# Patient Record
Sex: MALE | Race: OTHER | HISPANIC OR LATINO | URBAN - METROPOLITAN AREA
[De-identification: names, ages, dates, MRNs, and addresses within clinical notes are randomized per-mention and may not be internally consistent; named-entity substitution may affect disease eponyms.]

---

## 2017-05-24 ENCOUNTER — EMERGENCY (EMERGENCY)
Facility: HOSPITAL | Age: 47
LOS: 1 days | Discharge: HOME | End: 2017-05-24
Attending: EMERGENCY MEDICINE
Payer: COMMERCIAL

## 2017-05-24 VITALS
HEIGHT: 69 IN | RESPIRATION RATE: 20 BRPM | HEART RATE: 89 BPM | SYSTOLIC BLOOD PRESSURE: 142 MMHG | OXYGEN SATURATION: 97 % | DIASTOLIC BLOOD PRESSURE: 83 MMHG | TEMPERATURE: 97 F | WEIGHT: 220.02 LBS

## 2017-05-24 VITALS
RESPIRATION RATE: 18 BRPM | SYSTOLIC BLOOD PRESSURE: 144 MMHG | DIASTOLIC BLOOD PRESSURE: 86 MMHG | HEART RATE: 93 BPM | TEMPERATURE: 99 F | OXYGEN SATURATION: 100 %

## 2017-05-24 LAB
ALBUMIN SERPL ELPH-MCNC: 4 G/DL — SIGNIFICANT CHANGE UP (ref 3.5–5)
ALP SERPL-CCNC: 60 U/L — SIGNIFICANT CHANGE UP (ref 40–120)
ALT FLD-CCNC: 38 U/L DA — SIGNIFICANT CHANGE UP (ref 10–60)
ANION GAP SERPL CALC-SCNC: 8 MMOL/L — SIGNIFICANT CHANGE UP (ref 5–17)
APPEARANCE UR: CLEAR — SIGNIFICANT CHANGE UP
AST SERPL-CCNC: 29 U/L — SIGNIFICANT CHANGE UP (ref 10–40)
BASOPHILS # BLD AUTO: 0.1 K/UL — SIGNIFICANT CHANGE UP (ref 0–0.2)
BASOPHILS NFR BLD AUTO: 0.7 % — SIGNIFICANT CHANGE UP (ref 0–2)
BILIRUB SERPL-MCNC: 0.4 MG/DL — SIGNIFICANT CHANGE UP (ref 0.2–1.2)
BILIRUB UR-MCNC: NEGATIVE — SIGNIFICANT CHANGE UP
BUN SERPL-MCNC: 17 MG/DL — SIGNIFICANT CHANGE UP (ref 7–18)
CALCIUM SERPL-MCNC: 8.7 MG/DL — SIGNIFICANT CHANGE UP (ref 8.4–10.5)
CHLORIDE SERPL-SCNC: 107 MMOL/L — SIGNIFICANT CHANGE UP (ref 96–108)
CO2 SERPL-SCNC: 26 MMOL/L — SIGNIFICANT CHANGE UP (ref 22–31)
COLOR SPEC: YELLOW — SIGNIFICANT CHANGE UP
CREAT SERPL-MCNC: 1.43 MG/DL — HIGH (ref 0.5–1.3)
DIFF PNL FLD: ABNORMAL
EOSINOPHIL # BLD AUTO: 0 K/UL — SIGNIFICANT CHANGE UP (ref 0–0.5)
EOSINOPHIL NFR BLD AUTO: 0.1 % — SIGNIFICANT CHANGE UP (ref 0–6)
GLUCOSE SERPL-MCNC: 122 MG/DL — HIGH (ref 70–99)
GLUCOSE UR QL: NEGATIVE — SIGNIFICANT CHANGE UP
HCT VFR BLD CALC: 51.6 % — HIGH (ref 39–50)
HGB BLD-MCNC: 16.6 G/DL — SIGNIFICANT CHANGE UP (ref 13–17)
KETONES UR-MCNC: NEGATIVE — SIGNIFICANT CHANGE UP
LEUKOCYTE ESTERASE UR-ACNC: NEGATIVE — SIGNIFICANT CHANGE UP
LIDOCAIN IGE QN: 110 U/L — SIGNIFICANT CHANGE UP (ref 73–393)
LYMPHOCYTES # BLD AUTO: 0.9 K/UL — LOW (ref 1–3.3)
LYMPHOCYTES # BLD AUTO: 6 % — LOW (ref 13–44)
MCHC RBC-ENTMCNC: 28.4 PG — SIGNIFICANT CHANGE UP (ref 27–34)
MCHC RBC-ENTMCNC: 32.2 GM/DL — SIGNIFICANT CHANGE UP (ref 32–36)
MCV RBC AUTO: 88 FL — SIGNIFICANT CHANGE UP (ref 80–100)
MONOCYTES # BLD AUTO: 0.6 K/UL — SIGNIFICANT CHANGE UP (ref 0–0.9)
MONOCYTES NFR BLD AUTO: 4.4 % — SIGNIFICANT CHANGE UP (ref 2–14)
NEUTROPHILS # BLD AUTO: 13.2 K/UL — HIGH (ref 1.8–7.4)
NEUTROPHILS NFR BLD AUTO: 88.9 % — HIGH (ref 43–77)
NITRITE UR-MCNC: NEGATIVE — SIGNIFICANT CHANGE UP
PH UR: 5 — SIGNIFICANT CHANGE UP (ref 5–8)
PLATELET # BLD AUTO: 260 K/UL — SIGNIFICANT CHANGE UP (ref 150–400)
POTASSIUM SERPL-MCNC: 4.4 MMOL/L — SIGNIFICANT CHANGE UP (ref 3.5–5.3)
POTASSIUM SERPL-SCNC: 4.4 MMOL/L — SIGNIFICANT CHANGE UP (ref 3.5–5.3)
PROT SERPL-MCNC: 7.7 G/DL — SIGNIFICANT CHANGE UP (ref 6–8.3)
PROT UR-MCNC: 100
RBC # BLD: 5.86 M/UL — HIGH (ref 4.2–5.8)
RBC # FLD: 12.6 % — SIGNIFICANT CHANGE UP (ref 10.3–14.5)
SODIUM SERPL-SCNC: 141 MMOL/L — SIGNIFICANT CHANGE UP (ref 135–145)
SP GR SPEC: 1.02 — SIGNIFICANT CHANGE UP (ref 1.01–1.02)
UROBILINOGEN FLD QL: NEGATIVE — SIGNIFICANT CHANGE UP
WBC # BLD: 14.8 K/UL — HIGH (ref 3.8–10.5)
WBC # FLD AUTO: 14.8 K/UL — HIGH (ref 3.8–10.5)

## 2017-05-24 PROCEDURE — 99284 EMERGENCY DEPT VISIT MOD MDM: CPT

## 2017-05-24 PROCEDURE — 74176 CT ABD & PELVIS W/O CONTRAST: CPT | Mod: 26

## 2017-05-24 PROCEDURE — 74176 CT ABD & PELVIS W/O CONTRAST: CPT

## 2017-05-24 PROCEDURE — 83690 ASSAY OF LIPASE: CPT

## 2017-05-24 PROCEDURE — 99284 EMERGENCY DEPT VISIT MOD MDM: CPT | Mod: 25

## 2017-05-24 PROCEDURE — 81001 URINALYSIS AUTO W/SCOPE: CPT

## 2017-05-24 PROCEDURE — 85027 COMPLETE CBC AUTOMATED: CPT

## 2017-05-24 PROCEDURE — 80053 COMPREHEN METABOLIC PANEL: CPT

## 2017-05-24 RX ORDER — KETOROLAC TROMETHAMINE 30 MG/ML
30 SYRINGE (ML) INJECTION ONCE
Qty: 0 | Refills: 0 | Status: DISCONTINUED | OUTPATIENT
Start: 2017-05-24 | End: 2017-05-24

## 2017-05-24 RX ORDER — OXYCODONE HYDROCHLORIDE 5 MG/1
1 TABLET ORAL
Qty: 12 | Refills: 0 | OUTPATIENT
Start: 2017-05-24 | End: 2017-05-27

## 2017-05-24 RX ORDER — IBUPROFEN 200 MG
1 TABLET ORAL
Qty: 20 | Refills: 0 | OUTPATIENT
Start: 2017-05-24 | End: 2017-05-29

## 2017-05-24 RX ORDER — TAMSULOSIN HYDROCHLORIDE 0.4 MG/1
1 CAPSULE ORAL
Qty: 14 | Refills: 0 | OUTPATIENT
Start: 2017-05-24 | End: 2017-06-07

## 2017-05-24 RX ORDER — ONDANSETRON 8 MG/1
4 TABLET, FILM COATED ORAL ONCE
Qty: 0 | Refills: 0 | Status: COMPLETED | OUTPATIENT
Start: 2017-05-24 | End: 2017-05-24

## 2017-05-24 RX ADMIN — ONDANSETRON 4 MILLIGRAM(S): 8 TABLET, FILM COATED ORAL at 14:56

## 2017-05-24 RX ADMIN — Medication 30 MILLIGRAM(S): at 14:56

## 2017-05-24 NOTE — ED PROVIDER NOTE - CONSTITUTIONAL, MLM
- - - Well appearing, well nourished, awake, alert, oriented to person, place, time/situation. normal...

## 2017-05-24 NOTE — ED PROVIDER NOTE - OBJECTIVE STATEMENT
45 y/o male with PMHx of Gout and DM presents to the ED c/o abdominal pain, back pain and vomiting today. This morning pt noticed blood in his urine so he went to his PMD who prescribed Cipro. Pt took the Cipro and then began vomiting 7x and feeling abdominal and back pain diffusely, but more on L side. Pt denies fever, chills, CP, SOB, diarrhea, or any other complaints. NKDA.

## 2017-05-24 NOTE — ED PROVIDER NOTE - MEDICAL DECISION MAKING DETAILS
45 y/o male with L flank pain, hematuria and vomiting. On exam had diffusely tender abdomen. Likely kidney stone. Will check labs to rule out other abdominal pathology and CT to confirm stone. Will treat with Toradol and Zofran.

## 2017-05-24 NOTE — ED PROVIDER NOTE - NS ED MD SCRIBE ATTENDING SCRIBE SECTIONS
PHYSICAL EXAM/DISPOSITION/VITAL SIGNS( Pullset)/HIV/HISTORY OF PRESENT ILLNESS/PAST MEDICAL/SURGICAL/SOCIAL HISTORY/REVIEW OF SYSTEMS

## 2017-05-24 NOTE — ED ADULT NURSE NOTE - OBJECTIVE STATEMENT
Patient complains of abdominal pain radiating to bilateral flank pain x today. Patient states dysuria, hematuria x today. Abdomen is soft, non distended, tender with facial grimacing upon palpation in all 4 quadrants. Denies fever, chills. MD evaluation in progress.

## 2017-05-26 ENCOUNTER — INPATIENT (INPATIENT)
Facility: HOSPITAL | Age: 47
LOS: 2 days | Discharge: HOME | DRG: 872 | End: 2017-05-29
Attending: INTERNAL MEDICINE | Admitting: INTERNAL MEDICINE
Payer: COMMERCIAL

## 2017-05-26 VITALS
HEIGHT: 69 IN | OXYGEN SATURATION: 99 % | HEART RATE: 140 BPM | RESPIRATION RATE: 20 BRPM | WEIGHT: 220.02 LBS | SYSTOLIC BLOOD PRESSURE: 153 MMHG | DIASTOLIC BLOOD PRESSURE: 83 MMHG | TEMPERATURE: 101 F

## 2017-05-26 LAB
ALBUMIN SERPL ELPH-MCNC: 3.2 G/DL — LOW (ref 3.5–5)
ANION GAP SERPL CALC-SCNC: 8 MMOL/L — SIGNIFICANT CHANGE UP (ref 5–17)
BUN SERPL-MCNC: 21 MG/DL — HIGH (ref 7–18)
CALCIUM SERPL-MCNC: 8.1 MG/DL — LOW (ref 8.4–10.5)
CHLORIDE SERPL-SCNC: 102 MMOL/L — SIGNIFICANT CHANGE UP (ref 96–108)
CO2 SERPL-SCNC: 27 MMOL/L — SIGNIFICANT CHANGE UP (ref 22–31)
GLUCOSE SERPL-MCNC: 157 MG/DL — HIGH (ref 70–99)
HCT VFR BLD CALC: 45.5 % — SIGNIFICANT CHANGE UP (ref 39–50)
HGB BLD-MCNC: 15.1 G/DL — SIGNIFICANT CHANGE UP (ref 13–17)
MCHC RBC-ENTMCNC: 29.1 PG — SIGNIFICANT CHANGE UP (ref 27–34)
MCHC RBC-ENTMCNC: 33.1 GM/DL — SIGNIFICANT CHANGE UP (ref 32–36)
MCV RBC AUTO: 88 FL — SIGNIFICANT CHANGE UP (ref 80–100)
PLATELET # BLD AUTO: 189 K/UL — SIGNIFICANT CHANGE UP (ref 150–400)
POTASSIUM SERPL-MCNC: 3.7 MMOL/L — SIGNIFICANT CHANGE UP (ref 3.5–5.3)
POTASSIUM SERPL-SCNC: 3.7 MMOL/L — SIGNIFICANT CHANGE UP (ref 3.5–5.3)
RBC # BLD: 5.17 M/UL — SIGNIFICANT CHANGE UP (ref 4.2–5.8)
RBC # FLD: 13.4 % — SIGNIFICANT CHANGE UP (ref 10.3–14.5)
SODIUM SERPL-SCNC: 137 MMOL/L — SIGNIFICANT CHANGE UP (ref 135–145)
WBC # BLD: 15.5 K/UL — HIGH (ref 3.8–10.5)
WBC # FLD AUTO: 15.5 K/UL — HIGH (ref 3.8–10.5)

## 2017-05-26 RX ORDER — SODIUM CHLORIDE 9 MG/ML
2000 INJECTION INTRAMUSCULAR; INTRAVENOUS; SUBCUTANEOUS ONCE
Qty: 0 | Refills: 0 | Status: COMPLETED | OUTPATIENT
Start: 2017-05-26 | End: 2017-05-26

## 2017-05-26 RX ORDER — KETOROLAC TROMETHAMINE 30 MG/ML
30 SYRINGE (ML) INJECTION ONCE
Qty: 0 | Refills: 0 | Status: DISCONTINUED | OUTPATIENT
Start: 2017-05-26 | End: 2017-05-26

## 2017-05-26 RX ORDER — TAMSULOSIN HYDROCHLORIDE 0.4 MG/1
0.4 CAPSULE ORAL AT BEDTIME
Qty: 0 | Refills: 0 | Status: DISCONTINUED | OUTPATIENT
Start: 2017-05-26 | End: 2017-05-29

## 2017-05-26 RX ORDER — ACETAMINOPHEN 500 MG
975 TABLET ORAL ONCE
Qty: 0 | Refills: 0 | Status: COMPLETED | OUTPATIENT
Start: 2017-05-26 | End: 2017-05-27

## 2017-05-26 RX ORDER — CEFTRIAXONE 500 MG/1
1 INJECTION, POWDER, FOR SOLUTION INTRAMUSCULAR; INTRAVENOUS ONCE
Qty: 0 | Refills: 0 | Status: COMPLETED | OUTPATIENT
Start: 2017-05-26 | End: 2017-05-26

## 2017-05-26 NOTE — ED PROVIDER NOTE - NS ED MD SCRIBE ATTENDING SCRIBE SECTIONS
REVIEW OF SYSTEMS/VITAL SIGNS( Pullset)/HIV/HISTORY OF PRESENT ILLNESS/PAST MEDICAL/SURGICAL/SOCIAL HISTORY/DISPOSITION/PHYSICAL EXAM

## 2017-05-26 NOTE — ED PROVIDER NOTE - OBJECTIVE STATEMENT
47 y/o M pt with PMHx of DM, presents to ED c/o back pain, with associating fever, chills, HA, and LLQ abd pain X today. Pt reports he had hematuria 6 days ago, visited his and was prescribed Cipro. Pt visited the ED 2 days ago for persistent pain, was diagnosed with a stone and was dc'd home with pain medication. Pt visits the ED today for concerns of a possible infection secondary to new onset of fever. Pt denies SOB, cough, CP, palpitations, dizziness, nausea, vomiting, diarrhea, or any other complaints. NKDA.

## 2017-05-27 DIAGNOSIS — N20.0 CALCULUS OF KIDNEY: ICD-10-CM

## 2017-05-27 DIAGNOSIS — N12 TUBULO-INTERSTITIAL NEPHRITIS, NOT SPECIFIED AS ACUTE OR CHRONIC: ICD-10-CM

## 2017-05-27 DIAGNOSIS — J98.11 ATELECTASIS: ICD-10-CM

## 2017-05-27 DIAGNOSIS — E11.9 TYPE 2 DIABETES MELLITUS WITHOUT COMPLICATIONS: ICD-10-CM

## 2017-05-27 DIAGNOSIS — Z29.9 ENCOUNTER FOR PROPHYLACTIC MEASURES, UNSPECIFIED: ICD-10-CM

## 2017-05-27 DIAGNOSIS — N28.9 DISORDER OF KIDNEY AND URETER, UNSPECIFIED: ICD-10-CM

## 2017-05-27 DIAGNOSIS — M10.9 GOUT, UNSPECIFIED: ICD-10-CM

## 2017-05-27 DIAGNOSIS — Z91.013 ALLERGY TO SEAFOOD: ICD-10-CM

## 2017-05-27 LAB
ALP SERPL-CCNC: 74 U/L — SIGNIFICANT CHANGE UP (ref 40–120)
ALT FLD-CCNC: 52 U/L DA — SIGNIFICANT CHANGE UP (ref 10–60)
ANION GAP SERPL CALC-SCNC: 9 MMOL/L — SIGNIFICANT CHANGE UP (ref 5–17)
APPEARANCE UR: CLEAR — SIGNIFICANT CHANGE UP
AST SERPL-CCNC: 41 U/L — HIGH (ref 10–40)
BACTERIA # UR AUTO: ABNORMAL /HPF
BILIRUB SERPL-MCNC: 1 MG/DL — SIGNIFICANT CHANGE UP (ref 0.2–1.2)
BILIRUB UR-MCNC: NEGATIVE — SIGNIFICANT CHANGE UP
BUN SERPL-MCNC: 23 MG/DL — HIGH (ref 7–18)
CALCIUM SERPL-MCNC: 7.8 MG/DL — LOW (ref 8.4–10.5)
CHLORIDE SERPL-SCNC: 106 MMOL/L — SIGNIFICANT CHANGE UP (ref 96–108)
CO2 SERPL-SCNC: 25 MMOL/L — SIGNIFICANT CHANGE UP (ref 22–31)
COLOR SPEC: YELLOW — SIGNIFICANT CHANGE UP
COMMENT - URINE: SIGNIFICANT CHANGE UP
CREAT SERPL-MCNC: 1.85 MG/DL — HIGH (ref 0.5–1.3)
CREAT SERPL-MCNC: 1.88 MG/DL — HIGH (ref 0.5–1.3)
DIFF PNL FLD: ABNORMAL
EPI CELLS # UR: ABNORMAL (ref 0–10)
GLUCOSE SERPL-MCNC: 102 MG/DL — HIGH (ref 70–99)
GLUCOSE UR QL: NEGATIVE — SIGNIFICANT CHANGE UP
GRAN CASTS # UR COMP ASSIST: ABNORMAL
HYALINE CASTS # UR AUTO: ABNORMAL
KETONES UR-MCNC: NEGATIVE — SIGNIFICANT CHANGE UP
LACTATE SERPL-SCNC: 0.8 MMOL/L — SIGNIFICANT CHANGE UP (ref 0.7–2)
LACTATE SERPL-SCNC: 1.2 MMOL/L — SIGNIFICANT CHANGE UP (ref 0.7–2)
LEUKOCYTE ESTERASE UR-ACNC: ABNORMAL
LYMPHOCYTES # BLD AUTO: 3 % — LOW (ref 13–44)
MONOCYTES NFR BLD AUTO: 14 % — SIGNIFICANT CHANGE UP (ref 2–14)
NEUTROPHILS NFR BLD AUTO: 82 % — HIGH (ref 43–77)
NITRITE UR-MCNC: NEGATIVE — SIGNIFICANT CHANGE UP
PH UR: 5 — SIGNIFICANT CHANGE UP (ref 5–8)
PLAT MORPH BLD: NORMAL — SIGNIFICANT CHANGE UP
POTASSIUM SERPL-MCNC: 4.2 MMOL/L — SIGNIFICANT CHANGE UP (ref 3.5–5.3)
POTASSIUM SERPL-SCNC: 4.2 MMOL/L — SIGNIFICANT CHANGE UP (ref 3.5–5.3)
PROT SERPL-MCNC: 7.4 G/DL — SIGNIFICANT CHANGE UP (ref 6–8.3)
PROT UR-MCNC: 30 MG/DL
RBC BLD AUTO: NORMAL — SIGNIFICANT CHANGE UP
RBC CASTS # UR COMP ASSIST: ABNORMAL /HPF (ref 0–2)
SODIUM SERPL-SCNC: 140 MMOL/L — SIGNIFICANT CHANGE UP (ref 135–145)
SP GR SPEC: 1.02 — SIGNIFICANT CHANGE UP (ref 1.01–1.02)
URATE SERPL-MCNC: 7.6 MG/DL — SIGNIFICANT CHANGE UP (ref 3.4–8.8)
UROBILINOGEN FLD QL: NEGATIVE — SIGNIFICANT CHANGE UP
VARIANT LYMPHS # BLD: 1 % — SIGNIFICANT CHANGE UP (ref 0–6)
WBC UR QL: SIGNIFICANT CHANGE UP /HPF (ref 0–5)

## 2017-05-27 PROCEDURE — 74176 CT ABD & PELVIS W/O CONTRAST: CPT | Mod: 26

## 2017-05-27 PROCEDURE — 71010: CPT | Mod: 26

## 2017-05-27 PROCEDURE — 99285 EMERGENCY DEPT VISIT HI MDM: CPT

## 2017-05-27 RX ORDER — ONDANSETRON 8 MG/1
4 TABLET, FILM COATED ORAL ONCE
Qty: 0 | Refills: 0 | Status: COMPLETED | OUTPATIENT
Start: 2017-05-27 | End: 2017-05-27

## 2017-05-27 RX ORDER — ONDANSETRON 8 MG/1
8 TABLET, FILM COATED ORAL ONCE
Qty: 0 | Refills: 0 | Status: COMPLETED | OUTPATIENT
Start: 2017-05-27 | End: 2017-05-27

## 2017-05-27 RX ORDER — CEFTRIAXONE 500 MG/1
1 INJECTION, POWDER, FOR SOLUTION INTRAMUSCULAR; INTRAVENOUS EVERY 24 HOURS
Qty: 0 | Refills: 0 | Status: DISCONTINUED | OUTPATIENT
Start: 2017-05-27 | End: 2017-05-29

## 2017-05-27 RX ORDER — KETOROLAC TROMETHAMINE 30 MG/ML
15 SYRINGE (ML) INJECTION EVERY 6 HOURS
Qty: 0 | Refills: 0 | Status: DISCONTINUED | OUTPATIENT
Start: 2017-05-27 | End: 2017-05-27

## 2017-05-27 RX ORDER — SODIUM CHLORIDE 9 MG/ML
1000 INJECTION INTRAMUSCULAR; INTRAVENOUS; SUBCUTANEOUS
Qty: 0 | Refills: 0 | Status: DISCONTINUED | OUTPATIENT
Start: 2017-05-27 | End: 2017-05-29

## 2017-05-27 RX ORDER — ACETAMINOPHEN 500 MG
650 TABLET ORAL ONCE
Qty: 0 | Refills: 0 | Status: COMPLETED | OUTPATIENT
Start: 2017-05-27 | End: 2017-05-27

## 2017-05-27 RX ORDER — MORPHINE SULFATE 50 MG/1
4 CAPSULE, EXTENDED RELEASE ORAL ONCE
Qty: 0 | Refills: 0 | Status: DISCONTINUED | OUTPATIENT
Start: 2017-05-27 | End: 2017-05-27

## 2017-05-27 RX ADMIN — ONDANSETRON 4 MILLIGRAM(S): 8 TABLET, FILM COATED ORAL at 17:47

## 2017-05-27 RX ADMIN — SODIUM CHLORIDE 2000 MILLILITER(S): 9 INJECTION INTRAMUSCULAR; INTRAVENOUS; SUBCUTANEOUS at 00:31

## 2017-05-27 RX ADMIN — SODIUM CHLORIDE 100 MILLILITER(S): 9 INJECTION INTRAMUSCULAR; INTRAVENOUS; SUBCUTANEOUS at 06:38

## 2017-05-27 RX ADMIN — Medication 30 MILLIGRAM(S): at 00:34

## 2017-05-27 RX ADMIN — SODIUM CHLORIDE 100 MILLILITER(S): 9 INJECTION INTRAMUSCULAR; INTRAVENOUS; SUBCUTANEOUS at 04:41

## 2017-05-27 RX ADMIN — MORPHINE SULFATE 4 MILLIGRAM(S): 50 CAPSULE, EXTENDED RELEASE ORAL at 03:00

## 2017-05-27 RX ADMIN — TAMSULOSIN HYDROCHLORIDE 0.4 MILLIGRAM(S): 0.4 CAPSULE ORAL at 00:34

## 2017-05-27 RX ADMIN — CEFTRIAXONE 100 GRAM(S): 500 INJECTION, POWDER, FOR SOLUTION INTRAMUSCULAR; INTRAVENOUS at 05:05

## 2017-05-27 RX ADMIN — Medication 30 MILLIGRAM(S): at 02:39

## 2017-05-27 RX ADMIN — ONDANSETRON 4 MILLIGRAM(S): 8 TABLET, FILM COATED ORAL at 12:30

## 2017-05-27 RX ADMIN — CEFTRIAXONE 100 GRAM(S): 500 INJECTION, POWDER, FOR SOLUTION INTRAMUSCULAR; INTRAVENOUS at 00:34

## 2017-05-27 RX ADMIN — MORPHINE SULFATE 4 MILLIGRAM(S): 50 CAPSULE, EXTENDED RELEASE ORAL at 02:31

## 2017-05-27 RX ADMIN — Medication 975 MILLIGRAM(S): at 00:34

## 2017-05-27 RX ADMIN — SODIUM CHLORIDE 100 MILLILITER(S): 9 INJECTION INTRAMUSCULAR; INTRAVENOUS; SUBCUTANEOUS at 18:59

## 2017-05-27 RX ADMIN — Medication 650 MILLIGRAM(S): at 23:51

## 2017-05-27 RX ADMIN — Medication 650 MILLIGRAM(S): at 22:51

## 2017-05-27 RX ADMIN — TAMSULOSIN HYDROCHLORIDE 0.4 MILLIGRAM(S): 0.4 CAPSULE ORAL at 22:51

## 2017-05-27 NOTE — H&P ADULT - PROBLEM SELECTOR PLAN 2
with urate crystals and noted hx of gout  pt is no longer on allopurinol, but given acute uric acid stones- pt will likely need to initiate chronic therapy.  f/u uric acid levels

## 2017-05-27 NOTE — H&P ADULT - ASSESSMENT
46 M with gout p/w c/o L flank pain, hematuria, fever/chills with CT evidence of pyelitis, admitted with clinical pyelonephritis

## 2017-05-27 NOTE — H&P ADULT - ATTENDING COMMENTS
46 M from home with PMH of gout p/w c/o L flank pain, hematuria, fever/chills, and n/v for a few days. Pt tells that he developed hematuria and abdominal discomfort a few days, and then started having fevers, rigors, n/v, a/w L flank pain. Pt denies dysuria, cough, SOB, diarrhea, CP. In ED, pt is febrile to 100.5, leukocytosis >15k, CT concerning for L pyelitis (previously pt had 3.5 mm calculus in the left UVJ with mild left hydroureteronephrosis on CT from 3 days earlier). Pt given IVF and rocephin in ED. Pt passed stone in ED.    pt seen in bed, vitals stable except for tachicardia and tmx 100.5 on admission, physical exam reveals lungs cta b/l, heart s1s2, abd soft nd bs+, left flank tender, ext no edema. labs and diagnostic test result reviewed.    assessment  --  acute pyelonephritis, sepsis, agusto, s/p left renal stone with hydro, microscopic hematuria, atelectasis, h/o gout    plan  --  admit to med, rocephin, cont preadmit home meds, gi and dvt profilaxis,  cbc, bmp, mg, phos, lipids, tsh, bld cx, ua, ucx,    id cons  pulm cons

## 2017-05-27 NOTE — CONSULT NOTE ADULT - PROBLEM SELECTOR RECOMMENDATION 4
Monitor labs  Renal eval  Encourage fluid intake Monitor labs  Renal eval  Encourage fluid intake  Urology Eval

## 2017-05-27 NOTE — H&P ADULT - PROBLEM SELECTOR PLAN 1
with radiographic evidence of pyelitis on CT and clinical presentation consistent with pyelonephritis 2/2  stone.  c/w IV, rocephin; f/u cultures  pt passed stone

## 2017-05-27 NOTE — H&P ADULT - MUSCULOSKELETAL
negative detailed exam no joint erythema/ROM intact/no joint swelling/no joint warmth/no calf tenderness

## 2017-05-27 NOTE — H&P ADULT - HISTORY OF PRESENT ILLNESS
46 M from home with PMH of gout p/w c/o L flank pain, hematuria, fever/chills, and n/v for a few days. Pt tells that he developed hematuria and abdominal discomfort a few days, and then started having fevers, rigors, n/v, a/w L flank pain. Pt denies dysuria, cough, SOB, diarrhea, CP. In ED, pt is febrile to 100.5, leukocytosis >15k, CT concerning for L pyelitis (previously pt had 3.5 mm calculus in the left UVJ with mild left hydroureteronephrosis on CT from 3 days earlier). Pt given IVF and rocephin in ED. Pt passed stone in ED.

## 2017-05-27 NOTE — CONSULT NOTE ADULT - CONSULT REASON
Bibasilar Subsegmental Atelectasis on CT scan Renal Bibasilar Subsegmental Atelectasis on CT scan of Abdomen

## 2017-05-27 NOTE — CONSULT NOTE ADULT - SUBJECTIVE AND OBJECTIVE BOX
Patient is a 46y old  Male who presents with a chief complaint of flank pain, hematuria, fever (27 May 2017 04:57). He has hx of Renal stone.  Patient is being evaluated for bibasilar subsegmental atelectasis on CT scan renal. He has no cough, sob, wheezing  Awake, alert, comfortable in NAD.      INTERVAL HPI/OVERNIGHT EVENTS:      VITAL SIGNS:  T(F): 97.4  HR: 84  BP: 121/67  RR: 14  SpO2: 98%  Wt(kg): --  I&O's Detail          REVIEW OF SYSTEMS:    CONSTITUTIONAL:  No fevers, chills, sweats    HEENT:  Eyes:  No diplopia or blurred vision. ENT:  No earache, sore throat or runny nose.    CARDIOVASCULAR:  No pressure, squeezing, tightness, or heaviness about the chest; no palpitations.    RESPIRATORY:  Per HPI    GASTROINTESTINAL:  No abdominal pain, nausea, vomiting or diarrhea.    GENITOURINARY:  No dysuria, frequency or urgency.    NEUROLOGIC:  No paresthesias, fasciculations, seizures or weakness.    PSYCHIATRIC:  No disorder of thought or mood.      PHYSICAL EXAM:    Constitutional: Well developed and nourished  Eyes:Perrla  ENMT: normal  Neck:supple  Respiratory: good air entry  Cardiovascular: S1 S2 regular  Gastrointestinal: Soft, Non tender  Extremities: No edema  Vascular:normal  Neurological:Awake, alert,Ox3  Musculoskeletal:Normal      MEDICATIONS  (STANDING):  tamsulosin 0.4milliGRAM(s) Oral at bedtime  sodium chloride 0.9%. 1000milliLiter(s) IV Continuous <Continuous>  cefTRIAXone   IVPB 1Gram(s) IV Intermittent every 24 hours    MEDICATIONS  (PRN):  oxyCODONE  5 mG/acetaminophen 325 mG 2Tablet(s) Oral every 4 hours PRN Moderate Pain (4 - 6)      Allergies    No Known Drug Allergies  Seafood (Anaphylaxis)    Intolerances        LABS:                        15.1   15.5  )-----------( 189      ( 26 May 2017 23:47 )             45.5     -    140  |  106  |  23<H>  ----------------------------<  102<H>  4.2   |  25  |  1.85<H>    Ca    7.8<L>      27 May 2017 05:52    TPro  7.4  /  Alb  3.2<L>  /  TBili  1.0  /  DBili  x   /  AST  41<H>  /  ALT  52  /  AlkPhos  74  05-26      Urinalysis Basic - ( 27 May 2017 03:31 )    Color: Yellow / Appearance: Clear / S.020 / pH: x  Gluc: x / Ketone: Negative  / Bili: Negative / Urobili: Negative   Blood: x / Protein: 30 mg/dL / Nitrite: Negative   Leuk Esterase: Small / RBC: 2-5 /HPF / WBC 3-5 /HPF   Sq Epi: x / Non Sq Epi: Few / Bacteria: Few /HPF            CAPILLARY BLOOD GLUCOSE  110 (27 May 2017 11:28)  108 (27 May 2017 08:18)        RADIOLOGY & ADDITIONAL TESTS:    CXR:      Ct scan renal :  PROCEDURE DATE:  2017    LOWER CHEST: Bibasilar subsegmental atelectasis.    echo: Patient is a 46y old  Male who presents with a chief complaint of flank pain, hematuria, fever (27 May 2017 04:57). He has hx of Renal stone.  Patient is being evaluated for bibasilar subsegmental atelectasis on CT scan of abdomenl. He has no cough, sob, wheezing  Awake, alert, comfortable in NAD.      INTERVAL HPI/OVERNIGHT EVENTS:      VITAL SIGNS:  T(F): 97.4  HR: 84  BP: 121/67  RR: 14  SpO2: 98%  Wt(kg): --  I&O's Detail          REVIEW OF SYSTEMS:    CONSTITUTIONAL:  No fevers, chills, sweats    HEENT:  Eyes:  No diplopia or blurred vision. ENT:  No earache, sore throat or runny nose.    CARDIOVASCULAR:  No pressure, squeezing, tightness, or heaviness about the chest; no palpitations.    RESPIRATORY:  Per HPI    GASTROINTESTINAL:  No abdominal pain, nausea, vomiting or diarrhea.    GENITOURINARY:  No dysuria, frequency or urgency.    NEUROLOGIC:  No paresthesias, fasciculations, seizures or weakness.    PSYCHIATRIC:  No disorder of thought or mood.      PHYSICAL EXAM:    Constitutional: Well developed and nourished  Eyes:Perrla  ENMT: normal  Neck:supple  Respiratory: good air entry  Cardiovascular: S1 S2 regular  Gastrointestinal: Soft, Non tender  Extremities: No edema  Vascular:normal  Neurological:Awake, alert,Ox3  Musculoskeletal:Normal      MEDICATIONS  (STANDING):  tamsulosin 0.4milliGRAM(s) Oral at bedtime  sodium chloride 0.9%. 1000milliLiter(s) IV Continuous <Continuous>  cefTRIAXone   IVPB 1Gram(s) IV Intermittent every 24 hours    MEDICATIONS  (PRN):  oxyCODONE  5 mG/acetaminophen 325 mG 2Tablet(s) Oral every 4 hours PRN Moderate Pain (4 - 6)      Allergies    No Known Drug Allergies  Seafood (Anaphylaxis)    Intolerances        LABS:                        15.1   15.5  )-----------( 189      ( 26 May 2017 23:47 )             45.5         140  |  106  |  23<H>  ----------------------------<  102<H>  4.2   |  25  |  1.85<H>    Ca    7.8<L>      27 May 2017 05:52    TPro  7.4  /  Alb  3.2<L>  /  TBili  1.0  /  DBili  x   /  AST  41<H>  /  ALT  52  /  AlkPhos  74  05-26      Urinalysis Basic - ( 27 May 2017 03:31 )    Color: Yellow / Appearance: Clear / S.020 / pH: x  Gluc: x / Ketone: Negative  / Bili: Negative / Urobili: Negative   Blood: x / Protein: 30 mg/dL / Nitrite: Negative   Leuk Esterase: Small / RBC: 2-5 /HPF / WBC 3-5 /HPF   Sq Epi: x / Non Sq Epi: Few / Bacteria: Few /HPF            CAPILLARY BLOOD GLUCOSE  110 (27 May 2017 11:28)  108 (27 May 2017 08:18)        RADIOLOGY & ADDITIONAL TESTS:    CXR:      Ct scan renal :  PROCEDURE DATE:  2017    LOWER CHEST: Bibasilar subsegmental atelectasis.    echo:

## 2017-05-28 DIAGNOSIS — R11.0 NAUSEA: ICD-10-CM

## 2017-05-28 LAB
ANION GAP SERPL CALC-SCNC: 5 MMOL/L — SIGNIFICANT CHANGE UP (ref 5–17)
BASOPHILS # BLD AUTO: 0.1 K/UL — SIGNIFICANT CHANGE UP (ref 0–0.2)
BASOPHILS NFR BLD AUTO: 1.2 % — SIGNIFICANT CHANGE UP (ref 0–2)
BUN SERPL-MCNC: 26 MG/DL — HIGH (ref 7–18)
CALCIUM SERPL-MCNC: 8.3 MG/DL — LOW (ref 8.4–10.5)
CHLORIDE SERPL-SCNC: 107 MMOL/L — SIGNIFICANT CHANGE UP (ref 96–108)
CO2 SERPL-SCNC: 27 MMOL/L — SIGNIFICANT CHANGE UP (ref 22–31)
CREAT ?TM UR-MCNC: 139 MG/DL — SIGNIFICANT CHANGE UP
CREAT SERPL-MCNC: 1.59 MG/DL — HIGH (ref 0.5–1.3)
CULTURE RESULTS: NO GROWTH — SIGNIFICANT CHANGE UP
EOSINOPHIL # BLD AUTO: 0.2 K/UL — SIGNIFICANT CHANGE UP (ref 0–0.5)
EOSINOPHIL NFR BLD AUTO: 1.7 % — SIGNIFICANT CHANGE UP (ref 0–6)
GLUCOSE SERPL-MCNC: 88 MG/DL — SIGNIFICANT CHANGE UP (ref 70–99)
HBA1C BLD-MCNC: 5.9 % — HIGH (ref 4–5.6)
HCT VFR BLD CALC: 39.1 % — SIGNIFICANT CHANGE UP (ref 39–50)
HGB BLD-MCNC: 13.3 G/DL — SIGNIFICANT CHANGE UP (ref 13–17)
LYMPHOCYTES # BLD AUTO: 1.1 K/UL — SIGNIFICANT CHANGE UP (ref 1–3.3)
LYMPHOCYTES # BLD AUTO: 11.9 % — LOW (ref 13–44)
MCHC RBC-ENTMCNC: 30 PG — SIGNIFICANT CHANGE UP (ref 27–34)
MCHC RBC-ENTMCNC: 34 GM/DL — SIGNIFICANT CHANGE UP (ref 32–36)
MCV RBC AUTO: 88.2 FL — SIGNIFICANT CHANGE UP (ref 80–100)
MONOCYTES # BLD AUTO: 1.2 K/UL — HIGH (ref 0–0.9)
MONOCYTES NFR BLD AUTO: 12.6 % — SIGNIFICANT CHANGE UP (ref 2–14)
NEUTROPHILS # BLD AUTO: 6.6 K/UL — SIGNIFICANT CHANGE UP (ref 1.8–7.4)
NEUTROPHILS NFR BLD AUTO: 72.5 % — SIGNIFICANT CHANGE UP (ref 43–77)
OSMOLALITY UR: 613 MOS/KG — SIGNIFICANT CHANGE UP (ref 50–1200)
PLATELET # BLD AUTO: 178 K/UL — SIGNIFICANT CHANGE UP (ref 150–400)
POTASSIUM SERPL-MCNC: 3.8 MMOL/L — SIGNIFICANT CHANGE UP (ref 3.5–5.3)
POTASSIUM SERPL-SCNC: 3.8 MMOL/L — SIGNIFICANT CHANGE UP (ref 3.5–5.3)
PROT ?TM UR-MCNC: 128 MG/DL — HIGH (ref 0–12)
RBC # BLD: 4.43 M/UL — SIGNIFICANT CHANGE UP (ref 4.2–5.8)
RBC # FLD: 13.6 % — SIGNIFICANT CHANGE UP (ref 10.3–14.5)
SODIUM SERPL-SCNC: 139 MMOL/L — SIGNIFICANT CHANGE UP (ref 135–145)
SODIUM UR-SCNC: 76 MMOL/L — SIGNIFICANT CHANGE UP (ref 40–220)
SPECIMEN SOURCE: SIGNIFICANT CHANGE UP
WBC # BLD: 9.2 K/UL — SIGNIFICANT CHANGE UP (ref 3.8–10.5)
WBC # FLD AUTO: 9.2 K/UL — SIGNIFICANT CHANGE UP (ref 3.8–10.5)

## 2017-05-28 PROCEDURE — 74000: CPT | Mod: 26

## 2017-05-28 RX ORDER — POLYETHYLENE GLYCOL 3350 17 G/17G
17 POWDER, FOR SOLUTION ORAL DAILY
Qty: 0 | Refills: 0 | Status: DISCONTINUED | OUTPATIENT
Start: 2017-05-28 | End: 2017-05-29

## 2017-05-28 RX ORDER — SENNA PLUS 8.6 MG/1
2 TABLET ORAL AT BEDTIME
Qty: 0 | Refills: 0 | Status: DISCONTINUED | OUTPATIENT
Start: 2017-05-28 | End: 2017-05-29

## 2017-05-28 RX ORDER — ACETAMINOPHEN 500 MG
650 TABLET ORAL EVERY 6 HOURS
Qty: 0 | Refills: 0 | Status: DISCONTINUED | OUTPATIENT
Start: 2017-05-28 | End: 2017-05-29

## 2017-05-28 RX ORDER — DOCUSATE SODIUM 100 MG
100 CAPSULE ORAL THREE TIMES A DAY
Qty: 0 | Refills: 0 | Status: DISCONTINUED | OUTPATIENT
Start: 2017-05-28 | End: 2017-05-29

## 2017-05-28 RX ORDER — ONDANSETRON 8 MG/1
4 TABLET, FILM COATED ORAL ONCE
Qty: 0 | Refills: 0 | Status: COMPLETED | OUTPATIENT
Start: 2017-05-28 | End: 2017-05-28

## 2017-05-28 RX ADMIN — Medication 100 MILLIGRAM(S): at 06:44

## 2017-05-28 RX ADMIN — Medication 650 MILLIGRAM(S): at 17:50

## 2017-05-28 RX ADMIN — CEFTRIAXONE 100 GRAM(S): 500 INJECTION, POWDER, FOR SOLUTION INTRAMUSCULAR; INTRAVENOUS at 04:17

## 2017-05-28 RX ADMIN — ONDANSETRON 4 MILLIGRAM(S): 8 TABLET, FILM COATED ORAL at 11:45

## 2017-05-28 RX ADMIN — TAMSULOSIN HYDROCHLORIDE 0.4 MILLIGRAM(S): 0.4 CAPSULE ORAL at 21:15

## 2017-05-28 RX ADMIN — Medication 650 MILLIGRAM(S): at 04:16

## 2017-05-28 RX ADMIN — POLYETHYLENE GLYCOL 3350 17 GRAM(S): 17 POWDER, FOR SOLUTION ORAL at 14:26

## 2017-05-28 RX ADMIN — Medication 650 MILLIGRAM(S): at 16:51

## 2017-05-28 RX ADMIN — Medication 100 MILLIGRAM(S): at 21:15

## 2017-05-28 RX ADMIN — SENNA PLUS 2 TABLET(S): 8.6 TABLET ORAL at 21:15

## 2017-05-28 RX ADMIN — Medication 100 MILLIGRAM(S): at 14:26

## 2017-05-28 RX ADMIN — Medication 650 MILLIGRAM(S): at 05:06

## 2017-05-28 RX ADMIN — POLYETHYLENE GLYCOL 3350 17 GRAM(S): 17 POWDER, FOR SOLUTION ORAL at 06:43

## 2017-05-28 NOTE — PROGRESS NOTE ADULT - SUBJECTIVE AND OBJECTIVE BOX
Patient is a 46y old  Male who presents with a chief complaint of flank pain, hematuria, fever (27 May 2017 04:57)    pt seen in icu [  ], reg med floor [ x  ], bed [x  ], chair at bedside [   ], a+o x3 [x  ], lethargic [  ],  nad [x  ]    states feeling better        Allergies    No Known Drug Allergies  Seafood (Anaphylaxis)        Vitals    T(F): 98, Max: 98.4 (05-27 @ 21:29)  HR: 86 (74 - 90)  BP: 125/65 (118/62 - 131/73)  RR: 16 (14 - 16)  SpO2: 97% (97% - 98%)  Wt(kg): --  CAPILLARY BLOOD GLUCOSE  79 (28 May 2017 07:48)      Labs                          13.3   9.2   )-----------( 178      ( 28 May 2017 05:51 )             39.1       05-28    139  |  107  |  26<H>  ----------------------------<  88  3.8   |  27  |  1.59<H>    Ca    8.3<L>      28 May 2017 05:51    TPro  7.4  /  Alb  3.2<L>  /  TBili  1.0  /  DBili  x   /  AST  41<H>  /  ALT  52  /  AlkPhos  74  05-26                Radiology Results      Meds    MEDICATIONS  (STANDING):  tamsulosin 0.4milliGRAM(s) Oral at bedtime  sodium chloride 0.9%. 1000milliLiter(s) IV Continuous <Continuous>  cefTRIAXone   IVPB 1Gram(s) IV Intermittent every 24 hours  docusate sodium 100milliGRAM(s) Oral three times a day  polyethylene glycol 3350 17Gram(s) Oral daily      MEDICATIONS  (PRN):  oxyCODONE  5 mG/acetaminophen 325 mG 2Tablet(s) Oral every 4 hours PRN Moderate Pain (4 - 6)  acetaminophen   Tablet. 650milliGRAM(s) Oral every 6 hours PRN Mild Pain (1 - 3)      Physical Exam    Neuro :  no focal deficits  Respiratory: CTA B/L  CV: RRR, S1S2, no murmurs,   Abdominal: Soft, NT, ND +BS,  Extremities: No edema, + peripheral pulses    ASSESSMENT    acute pyelonephritis  sepsis  ROSEANN  Tubulointerstitial nephritis  S/P Lrenal stone with Hydronephrosis  microscopic hematuria  atelectasis  Gout  Diabetes  No significant past surgical history      PLAN    rocephin,   id cons  f/u urine and blood cx  pulm f/u  cont current meds

## 2017-05-28 NOTE — PROGRESS NOTE ADULT - SUBJECTIVE AND OBJECTIVE BOX
Patient is a 46y old  Male who presents with a chief complaint of flank pain, hematuria, fever (27 May 2017 04:57)  He has hx of renal stone  Patient is being followed for bibasilar subsegmental atelectasis on CT scan abdomen. She has no chest pain, sob, cough, wheezing.  Awake , alert. comfortable in NAD      INTERVAL HPI/OVERNIGHT EVENTS:      VITAL SIGNS:  T(F): 98  HR: 86  BP: 125/65  RR: 16  SpO2: 97%  Wt(kg): --  I&O's Detail          REVIEW OF SYSTEMS:    CONSTITUTIONAL:  No fevers, chills, sweats    HEENT:  Eyes:  No diplopia or blurred vision. ENT:  No earache, sore throat or runny nose.    CARDIOVASCULAR:  No pressure, squeezing, tightness, or heaviness about the chest; no palpitations.    RESPIRATORY:  Per HPI    GASTROINTESTINAL:  No abdominal pain, nausea, vomiting or diarrhea.    GENITOURINARY:  No dysuria, frequency or urgency.    NEUROLOGIC:  No paresthesias, fasciculations, seizures or weakness.    PSYCHIATRIC:  No disorder of thought or mood.      PHYSICAL EXAM:    Constitutional: Well developed and nourished  Eyes:Perrla  ENMT: normal  Neck:supple  Respiratory: good air entry  Cardiovascular: S1 S2 regular  Gastrointestinal: Soft, Non tender  Extremities: No edema  Vascular:normal  Neurological:Awake, alert,Ox3  Musculoskeletal:Normal      MEDICATIONS  (STANDING):  tamsulosin 0.4milliGRAM(s) Oral at bedtime  sodium chloride 0.9%. 1000milliLiter(s) IV Continuous <Continuous>  cefTRIAXone   IVPB 1Gram(s) IV Intermittent every 24 hours  docusate sodium 100milliGRAM(s) Oral three times a day  polyethylene glycol 3350 17Gram(s) Oral daily    MEDICATIONS  (PRN):  oxyCODONE  5 mG/acetaminophen 325 mG 2Tablet(s) Oral every 4 hours PRN Moderate Pain (4 - 6)  acetaminophen   Tablet. 650milliGRAM(s) Oral every 6 hours PRN Mild Pain (1 - 3)      Allergies    No Known Drug Allergies  Seafood (Anaphylaxis)    Intolerances        LABS:                        13.3   9.2   )-----------( 178      ( 28 May 2017 05:51 )             39.1     -    139  |  107  |  26<H>  ----------------------------<  88  3.8   |  27  |  1.59<H>    Ca    8.3<L>      28 May 2017 05:51    TPro  7.4  /  Alb  3.2<L>  /  TBili  1.0  /  DBili  x   /  AST  41<H>  /  ALT  52  /  AlkPhos  74  05-26      Urinalysis Basic - ( 27 May 2017 03:31 )    Color: Yellow / Appearance: Clear / S.020 / pH: x  Gluc: x / Ketone: Negative  / Bili: Negative / Urobili: Negative   Blood: x / Protein: 30 mg/dL / Nitrite: Negative   Leuk Esterase: Small / RBC: 2-5 /HPF / WBC 3-5 /HPF   Sq Epi: x / Non Sq Epi: Few / Bacteria: Few /HPF            CAPILLARY BLOOD GLUCOSE  79 (28 May 2017 07:48)  110 (27 May 2017 11:28)        RADIOLOGY & ADDITIONAL TESTS:    CXR:    MPRESSION:    Bibasilar subsegmental atelectasis.    Ct scan abdomen  LOWER CHEST: Bibasilar subsegmental atelectasis.    ekg;    echo: Patient is a 46y old  Male who presents with a chief complaint of flank pain, hematuria, fever (27 May 2017 04:57)  He has hx of renal stone  Patient is being followed for bibasilar subsegmental atelectasis on CT scan abdomen. He has no chest pain, sob, cough, wheezing.  Awake , alert, feeling nauseated and with headache.      INTERVAL HPI/OVERNIGHT EVENTS:      VITAL SIGNS:  T(F): 98  HR: 86  BP: 125/65  RR: 16  SpO2: 97%  Wt(kg): --  I&O's Detail          REVIEW OF SYSTEMS:    CONSTITUTIONAL:  No fevers, chills, sweats    HEENT:  Eyes:  No diplopia or blurred vision. ENT:  No earache, sore throat or runny nose.    CARDIOVASCULAR:  No pressure, squeezing, tightness, or heaviness about the chest; no palpitations.    RESPIRATORY:  Per HPI    GASTROINTESTINAL:  Nauseated    GENITOURINARY:  No dysuria, frequency or urgency.    NEUROLOGIC:  No paresthesias, fasciculations, seizures or weakness.    PSYCHIATRIC:  No disorder of thought or mood.      PHYSICAL EXAM:    Constitutional: Well developed and nourished  Eyes:Perrla  ENMT: normal  Neck:supple  Respiratory: good air entry  Cardiovascular: S1 S2 regular  Gastrointestinal: Soft, Non tender  Extremities: No edema  Vascular:normal  Neurological:Awake, alert,Ox3  Musculoskeletal:Normal      MEDICATIONS  (STANDING):  tamsulosin 0.4milliGRAM(s) Oral at bedtime  sodium chloride 0.9%. 1000milliLiter(s) IV Continuous <Continuous>  cefTRIAXone   IVPB 1Gram(s) IV Intermittent every 24 hours  docusate sodium 100milliGRAM(s) Oral three times a day  polyethylene glycol 3350 17Gram(s) Oral daily    MEDICATIONS  (PRN):  oxyCODONE  5 mG/acetaminophen 325 mG 2Tablet(s) Oral every 4 hours PRN Moderate Pain (4 - 6)  acetaminophen   Tablet. 650milliGRAM(s) Oral every 6 hours PRN Mild Pain (1 - 3)      Allergies    No Known Drug Allergies  Seafood (Anaphylaxis)    Intolerances        LABS:                        13.3   9.2   )-----------( 178      ( 28 May 2017 05:51 )             39.1     05-    139  |  107  |  26<H>  ----------------------------<  88  3.8   |  27  |  1.59<H>    Ca    8.3<L>      28 May 2017 05:51    TPro  7.4  /  Alb  3.2<L>  /  TBili  1.0  /  DBili  x   /  AST  41<H>  /  ALT  52  /  AlkPhos  74  05-26      Urinalysis Basic - ( 27 May 2017 03:31 )    Color: Yellow / Appearance: Clear / S.020 / pH: x  Gluc: x / Ketone: Negative  / Bili: Negative / Urobili: Negative   Blood: x / Protein: 30 mg/dL / Nitrite: Negative   Leuk Esterase: Small / RBC: 2-5 /HPF / WBC 3-5 /HPF   Sq Epi: x / Non Sq Epi: Few / Bacteria: Few /HPF            CAPILLARY BLOOD GLUCOSE  79 (28 May 2017 07:48)  110 (27 May 2017 11:28)        RADIOLOGY & ADDITIONAL TESTS:    CXR:    MPRESSION:    Bibasilar subsegmental atelectasis.    Ct scan abdomen  LOWER CHEST: Bibasilar subsegmental atelectasis.    ekg;    echo:

## 2017-05-29 VITALS
DIASTOLIC BLOOD PRESSURE: 71 MMHG | RESPIRATION RATE: 14 BRPM | OXYGEN SATURATION: 98 % | HEART RATE: 81 BPM | TEMPERATURE: 97 F | SYSTOLIC BLOOD PRESSURE: 122 MMHG

## 2017-05-29 LAB
ANION GAP SERPL CALC-SCNC: 9 MMOL/L — SIGNIFICANT CHANGE UP (ref 5–17)
BASOPHILS # BLD AUTO: 0.1 K/UL — SIGNIFICANT CHANGE UP (ref 0–0.2)
BASOPHILS NFR BLD AUTO: 1.1 % — SIGNIFICANT CHANGE UP (ref 0–2)
BUN SERPL-MCNC: 20 MG/DL — HIGH (ref 7–18)
CALCIUM SERPL-MCNC: 8.8 MG/DL — SIGNIFICANT CHANGE UP (ref 8.4–10.5)
CHLORIDE SERPL-SCNC: 105 MMOL/L — SIGNIFICANT CHANGE UP (ref 96–108)
CO2 SERPL-SCNC: 24 MMOL/L — SIGNIFICANT CHANGE UP (ref 22–31)
CREAT SERPL-MCNC: 1.31 MG/DL — HIGH (ref 0.5–1.3)
EOSINOPHIL # BLD AUTO: 0.2 K/UL — SIGNIFICANT CHANGE UP (ref 0–0.5)
EOSINOPHIL NFR BLD AUTO: 2.3 % — SIGNIFICANT CHANGE UP (ref 0–6)
GLUCOSE SERPL-MCNC: 81 MG/DL — SIGNIFICANT CHANGE UP (ref 70–99)
HCT VFR BLD CALC: 41.3 % — SIGNIFICANT CHANGE UP (ref 39–50)
HGB BLD-MCNC: 13.5 G/DL — SIGNIFICANT CHANGE UP (ref 13–17)
LYMPHOCYTES # BLD AUTO: 1.4 K/UL — SIGNIFICANT CHANGE UP (ref 1–3.3)
LYMPHOCYTES # BLD AUTO: 18.6 % — SIGNIFICANT CHANGE UP (ref 13–44)
MCHC RBC-ENTMCNC: 28.5 PG — SIGNIFICANT CHANGE UP (ref 27–34)
MCHC RBC-ENTMCNC: 32.5 GM/DL — SIGNIFICANT CHANGE UP (ref 32–36)
MCV RBC AUTO: 87.9 FL — SIGNIFICANT CHANGE UP (ref 80–100)
MONOCYTES # BLD AUTO: 0.8 K/UL — SIGNIFICANT CHANGE UP (ref 0–0.9)
MONOCYTES NFR BLD AUTO: 11.6 % — SIGNIFICANT CHANGE UP (ref 2–14)
NEUTROPHILS # BLD AUTO: 4.7 K/UL — SIGNIFICANT CHANGE UP (ref 1.8–7.4)
NEUTROPHILS NFR BLD AUTO: 65.4 % — SIGNIFICANT CHANGE UP (ref 43–77)
PLATELET # BLD AUTO: 262 K/UL — SIGNIFICANT CHANGE UP (ref 150–400)
POTASSIUM SERPL-MCNC: 3.7 MMOL/L — SIGNIFICANT CHANGE UP (ref 3.5–5.3)
POTASSIUM SERPL-SCNC: 3.7 MMOL/L — SIGNIFICANT CHANGE UP (ref 3.5–5.3)
RBC # BLD: 4.7 M/UL — SIGNIFICANT CHANGE UP (ref 4.2–5.8)
RBC # FLD: 12.3 % — SIGNIFICANT CHANGE UP (ref 10.3–14.5)
SODIUM SERPL-SCNC: 138 MMOL/L — SIGNIFICANT CHANGE UP (ref 135–145)
WBC # BLD: 7.4 K/UL — SIGNIFICANT CHANGE UP (ref 3.8–10.5)
WBC # FLD AUTO: 7.4 K/UL — SIGNIFICANT CHANGE UP (ref 3.8–10.5)

## 2017-05-29 PROCEDURE — 71045 X-RAY EXAM CHEST 1 VIEW: CPT

## 2017-05-29 PROCEDURE — 83605 ASSAY OF LACTIC ACID: CPT

## 2017-05-29 PROCEDURE — 87040 BLOOD CULTURE FOR BACTERIA: CPT

## 2017-05-29 PROCEDURE — 74018 RADEX ABDOMEN 1 VIEW: CPT

## 2017-05-29 PROCEDURE — 93005 ELECTROCARDIOGRAM TRACING: CPT

## 2017-05-29 PROCEDURE — 83036 HEMOGLOBIN GLYCOSYLATED A1C: CPT

## 2017-05-29 PROCEDURE — 82570 ASSAY OF URINE CREATININE: CPT

## 2017-05-29 PROCEDURE — 81001 URINALYSIS AUTO W/SCOPE: CPT

## 2017-05-29 PROCEDURE — 84300 ASSAY OF URINE SODIUM: CPT

## 2017-05-29 PROCEDURE — 84550 ASSAY OF BLOOD/URIC ACID: CPT

## 2017-05-29 PROCEDURE — 74176 CT ABD & PELVIS W/O CONTRAST: CPT

## 2017-05-29 PROCEDURE — 87086 URINE CULTURE/COLONY COUNT: CPT

## 2017-05-29 PROCEDURE — 84156 ASSAY OF PROTEIN URINE: CPT

## 2017-05-29 PROCEDURE — 80048 BASIC METABOLIC PNL TOTAL CA: CPT

## 2017-05-29 PROCEDURE — 80053 COMPREHEN METABOLIC PANEL: CPT

## 2017-05-29 PROCEDURE — 99285 EMERGENCY DEPT VISIT HI MDM: CPT | Mod: 25

## 2017-05-29 PROCEDURE — 85027 COMPLETE CBC AUTOMATED: CPT

## 2017-05-29 PROCEDURE — 83935 ASSAY OF URINE OSMOLALITY: CPT

## 2017-05-29 PROCEDURE — 96374 THER/PROPH/DIAG INJ IV PUSH: CPT

## 2017-05-29 RX ORDER — LACTULOSE 10 G/15ML
10 SOLUTION ORAL ONCE
Qty: 0 | Refills: 0 | Status: COMPLETED | OUTPATIENT
Start: 2017-05-29 | End: 2017-05-29

## 2017-05-29 RX ORDER — CEFOXITIN 1 G/1
1 INJECTION, POWDER, FOR SOLUTION INTRAVENOUS
Qty: 14 | Refills: 0 | OUTPATIENT
Start: 2017-05-29 | End: 2017-06-05

## 2017-05-29 RX ORDER — ONDANSETRON 8 MG/1
4 TABLET, FILM COATED ORAL ONCE
Qty: 0 | Refills: 0 | Status: DISCONTINUED | OUTPATIENT
Start: 2017-05-29 | End: 2017-05-29

## 2017-05-29 RX ORDER — CEFUROXIME AXETIL 250 MG
1 TABLET ORAL
Qty: 14 | Refills: 0 | OUTPATIENT
Start: 2017-05-29 | End: 2017-06-05

## 2017-05-29 RX ADMIN — Medication 650 MILLIGRAM(S): at 09:50

## 2017-05-29 RX ADMIN — LACTULOSE 10 GRAM(S): 10 SOLUTION ORAL at 11:38

## 2017-05-29 RX ADMIN — Medication 100 MILLIGRAM(S): at 05:09

## 2017-05-29 RX ADMIN — Medication 650 MILLIGRAM(S): at 00:39

## 2017-05-29 RX ADMIN — Medication 650 MILLIGRAM(S): at 09:19

## 2017-05-29 RX ADMIN — CEFTRIAXONE 100 GRAM(S): 500 INJECTION, POWDER, FOR SOLUTION INTRAMUSCULAR; INTRAVENOUS at 05:09

## 2017-05-29 RX ADMIN — Medication 650 MILLIGRAM(S): at 01:09

## 2017-05-29 RX ADMIN — POLYETHYLENE GLYCOL 3350 17 GRAM(S): 17 POWDER, FOR SOLUTION ORAL at 11:38

## 2017-05-29 NOTE — DISCHARGE NOTE ADULT - CARE PLAN
Principal Discharge DX:	Pyelonephritis  Goal:	Resolve acute infection  Instructions for follow-up, activity and diet:	You came in with flank pain secondary to Pyelonephritis. CT scan was done that showed pyelitis. Rocephin was started to treat pyelonephritis. You passed the stone in ED. Urine Cx was sent that remained –ve so antibiotics were changed to oral ceftin to complete total 7 day treatment.  Secondary Diagnosis:	Nephrolithiasis, uric acid  Goal:	Prevent repeat stone formation  Instructions for follow-up, activity and diet:	Your kidney stone cane be due to Gout. Your uric acid was normal in hospital. We suggest you to follow up with PCP to start allopurinol since you have kidney stone with gout.  Secondary Diagnosis:	ROSEANN (acute kidney injury)  Goal:	Prevent worsening kidney function  Instructions for follow-up, activity and diet:	You kidney injury was likely due to dehydration with kidney stone also affectinf kidney functino. Cr trended down with ic fluids. We recommend you to stay well hydrated and follow up with PCP within a week of discharge.  Secondary Diagnosis:	Prediabetes  Goal:	Maintain HbA1c<6.4  Instructions for follow-up, activity and diet:	Your HbA1c is 5.9, that mean you are at risk of Diabetes. Principal Discharge DX:	Pyelonephritis  Goal:	Resolve acute infection  Instructions for follow-up, activity and diet:	You came in with flank pain secondary to Pyelonephritis. CT scan was done that showed pyelitis. Rocephin was started to treat pyelonephritis. You passed the stone in ED. Urine Cx was sent that remained –ve so antibiotics were changed to oral ceftin to complete total 7 day treatment.  Secondary Diagnosis:	Nephrolithiasis, uric acid  Goal:	Prevent repeat stone formation  Instructions for follow-up, activity and diet:	Your kidney stone cane be due to Gout. Your uric acid was normal in hospital. We suggest you to follow up with PCP to start allopurinol since you have kidney stone with gout.  Secondary Diagnosis:	ROSEANN (acute kidney injury)  Goal:	Prevent worsening kidney function  Instructions for follow-up, activity and diet:	You kidney injury was likely due to dehydration with kidney stone also affectinf kidney functino. Cr trended down with ic fluids. We recommend you to stay well hydrated and follow up with PCP within a week of discharge.  Secondary Diagnosis:	Prediabetes  Goal:	Maintain HbA1c<6.4  Instructions for follow-up, activity and diet:	Your HbA1c is 5.9, that mean you are at risk of Diabetes. Exercise 3-5 times a week for atleast for 30 minutes. Diet low in carbohydrate and fat.

## 2017-05-29 NOTE — PROGRESS NOTE ADULT - SUBJECTIVE AND OBJECTIVE BOX
Patient is a 46y old  Male who presents with a chief complaint of flank pain, hematuria, fever (27 May 2017 04:57)    pt seen in icu [  ], reg med floor [ x  ], bed [x  ], chair at bedside [   ], a+o x3 [x  ], lethargic [  ],  nad [x  ]      Allergies    No Known Drug Allergies  Seafood (Anaphylaxis)        Vitals    T(F): 98.8, Max: 98.8 (05-29 @ 05:16)  HR: 89 (80 - 89)  BP: 112/54 (112/54 - 139/70)  RR: 17 (17 - 18)  SpO2: 97% (95% - 97%)  Wt(kg): --  CAPILLARY BLOOD GLUCOSE  81 (29 May 2017 07:52)      Labs                          13.5   7.4   )-----------( 262      ( 29 May 2017 06:30 )             41.3       05-29    138  |  105  |  20<H>  ----------------------------<  81  3.7   |  24  |  1.31<H>    Ca    8.8      29 May 2017 06:30              .Blood Blood-Peripheral  05-27 @ 10:12   No growth to date.  --  --      .Urine Clean Catch (Midstream)  05-27 @ 10:06   No growth  --  --          Radiology Results      Meds    MEDICATIONS  (STANDING):  tamsulosin 0.4milliGRAM(s) Oral at bedtime  sodium chloride 0.9%. 1000milliLiter(s) IV Continuous <Continuous>  cefTRIAXone   IVPB 1Gram(s) IV Intermittent every 24 hours  docusate sodium 100milliGRAM(s) Oral three times a day  polyethylene glycol 3350 17Gram(s) Oral daily  senna 2Tablet(s) Oral at bedtime      MEDICATIONS  (PRN):  oxyCODONE  5 mG/acetaminophen 325 mG 2Tablet(s) Oral every 4 hours PRN Moderate Pain (4 - 6)  acetaminophen   Tablet. 650milliGRAM(s) Oral every 6 hours PRN Mild Pain (1 - 3)      Physical Exam    Neuro :  no focal deficits  Respiratory: CTA B/L  CV: RRR, S1S2, no murmurs,   Abdominal: Soft, NT, ND +BS,  Extremities: No edema, + peripheral pulses    ASSESSMENT    Renal colic 2nd to passed renal calculus  acute pyelonephritis  sepsis  ROSEANN  Tubulointerstitial nephritis  S/P L renal stone with Hydronephrosis  microscopic hematuria  atelectasis  Gout  Diabetes  No significant past surgical history      PLAN    d/c rocephin,   start Ceftin 500 mg bid x 7 days  urine and blood cx and blood cx neg  pulm f/u  cont current meds   pt stable for d/c Patient is a 46y old  Male who presents with a chief complaint of flank pain, hematuria, fever (27 May 2017 04:57)    pt seen in icu [  ], reg med floor [ x  ], bed [x  ], chair at bedside [   ], a+o x3 [x  ], lethargic [  ],  nad [x  ]    c/o constipation      Allergies    No Known Drug Allergies  Seafood (Anaphylaxis)        Vitals    T(F): 98.8, Max: 98.8 (05-29 @ 05:16)  HR: 89 (80 - 89)  BP: 112/54 (112/54 - 139/70)  RR: 17 (17 - 18)  SpO2: 97% (95% - 97%)  Wt(kg): --  CAPILLARY BLOOD GLUCOSE  81 (29 May 2017 07:52)      Labs                          13.5   7.4   )-----------( 262      ( 29 May 2017 06:30 )             41.3       05-29    138  |  105  |  20<H>  ----------------------------<  81  3.7   |  24  |  1.31<H>    Ca    8.8      29 May 2017 06:30              .Blood Blood-Peripheral  05-27 @ 10:12   No growth to date.  --  --      .Urine Clean Catch (Midstream)  05-27 @ 10:06   No growth  --  --          Radiology Results      Meds    MEDICATIONS  (STANDING):  tamsulosin 0.4milliGRAM(s) Oral at bedtime  sodium chloride 0.9%. 1000milliLiter(s) IV Continuous <Continuous>  cefTRIAXone   IVPB 1Gram(s) IV Intermittent every 24 hours  docusate sodium 100milliGRAM(s) Oral three times a day  polyethylene glycol 3350 17Gram(s) Oral daily  senna 2Tablet(s) Oral at bedtime      MEDICATIONS  (PRN):  oxyCODONE  5 mG/acetaminophen 325 mG 2Tablet(s) Oral every 4 hours PRN Moderate Pain (4 - 6)  acetaminophen   Tablet. 650milliGRAM(s) Oral every 6 hours PRN Mild Pain (1 - 3)      Physical Exam    Neuro :  no focal deficits  Respiratory: CTA B/L  CV: RRR, S1S2, no murmurs,   Abdominal: Soft, NT, ND +BS,  Extremities: No edema, + peripheral pulses    ASSESSMENT    Renal colic 2nd to passed renal calculus  acute pyelonephritis  sepsis  ROSEANN  Tubulointerstitial nephritis  S/P L renal stone with Hydronephrosis  microscopic hematuria  constipation  atelectasis  Gout  Diabetes  No significant past surgical history      PLAN    d/c rocephin,   start Ceftin 500 mg bid x 7 days  urine and blood cx and blood cx neg  give lactulose 15 ml x1  cont colace and senna  pulm f/u  cont current meds   pt stable for d/c after bm

## 2017-05-29 NOTE — DISCHARGE NOTE ADULT - PATIENT PORTAL LINK FT
“You can access the [facility] Patient Portal, offered by [facility], by registering with the following website: [email or web address]”

## 2017-05-29 NOTE — DISCHARGE NOTE ADULT - HOSPITAL COURSE
46 M from home with PMH of gout p/w c/o L flank pain, hematuria, fever/chills, and n/v for a few days. Pt tells that he developed hematuria and abdominal discomfort a few days, and then started having fevers, rigors, n/v, a/w L flank pain. Pt denies dysuria, cough, SOB, diarrhea, CP. In ED, pt is febrile to 100.5, leukocytosis >15k,     Patient wasa admitted for suspected pyelonephritis. Cr scan abdomen was done and it read as No evidence for obstructive uropathy. Nonspecific trace bilateral perinephric infiltration and left  periureteral infiltration. There were concerns for pyelitis on CT scan. Patient passed the stone in ED. Rocephin was started to treat pyelonephritis. Urine Cx was sent that remained –ve so antibiotics were changed to ceftin to complete total 7 day treatment.     Patient has history of Nephrolithiasis, uric acid with urate crystals and noted hx of gout.  Pt is no longer on allopurinol, but given acute uric acid stones- pt will likely need to initiate chronic therapy. Uric acid was sent that was 7.6 normal. We suggest patient to follow up with PCP within a week of discharge.     RODRIGO on CKD: Patient had Rodrigo on admission, likely prerenal secondary to dehydration. RODRIGO responded well to fluids.    Patient is stable for discharge and should complete 7 day of antibiotics treatment. Follow up with PCP within a week of discharge

## 2017-05-29 NOTE — DISCHARGE NOTE ADULT - MEDICATION SUMMARY - MEDICATIONS TO TAKE
I will START or STAY ON the medications listed below when I get home from the hospital:    tamsulosin 0.4 mg oral capsule  -- 1 cap(s) by mouth once a day  -- It is very important that you take or use this exactly as directed.  Do not skip doses or discontinue unless directed by your doctor.  May cause drowsiness.  Alcohol may intensify this effect.  Use care when operating dangerous machinery.  Some non-prescription drugs may aggravate your condition.  Read all labels carefully.  If a warning appears, check with your doctor before taking.  Swallow whole.  Do not crush.  Take with food or milk.    -- Indication: For BPH    Ceftin 250 mg oral tablet  -- 1 tab(s) by mouth 2 times a day  -- Finish all this medication unless otherwise directed by prescriber.  Medication should be taken with plenty of water.  Take with food or milk.    -- Indication: For Pyelonephritis I will START or STAY ON the medications listed below when I get home from the hospital:    tamsulosin 0.4 mg oral capsule  -- 1 cap(s) by mouth once a day  -- It is very important that you take or use this exactly as directed.  Do not skip doses or discontinue unless directed by your doctor.  May cause drowsiness.  Alcohol may intensify this effect.  Use care when operating dangerous machinery.  Some non-prescription drugs may aggravate your condition.  Read all labels carefully.  If a warning appears, check with your doctor before taking.  Swallow whole.  Do not crush.  Take with food or milk.    -- Indication: For BPH    Ceftin 500 mg oral tablet  -- 1 tab(s) by mouth 2 times a day  -- Finish all this medication unless otherwise directed by prescriber.  Medication should be taken with plenty of water.  Take with food or milk.    -- Indication: For Pyelonephritis

## 2017-05-29 NOTE — DISCHARGE NOTE ADULT - ADDITIONAL INSTRUCTIONS
We recommend you to stay well hydrated and follow up with PCP within a week of   Exercise 3-5 times a week for atleast for 30 minutes. Diet low in carbohydrate and fat.

## 2017-05-29 NOTE — DISCHARGE NOTE ADULT - PLAN OF CARE
Resolve acute infection You came in with flank pain secondary to Pyelonephritis. CT scan was done that showed pyelitis. Rocephin was started to treat pyelonephritis. You passed the stone in ED. Urine Cx was sent that remained –ve so antibiotics were changed to oral ceftin to complete total 7 day treatment. Prevent repeat stone formation Your kidney stone cane be due to Gout. Your uric acid was normal in hospital. We suggest you to follow up with PCP to start allopurinol since you have kidney stone with gout. Prevent worsening kidney function You kidney injury was likely due to dehydration with kidney stone also affectinf kidney functino. Cr trended down with ic fluids. We recommend you to stay well hydrated and follow up with PCP within a week of discharge. Maintain HbA1c<6.4 Your HbA1c is 5.9, that mean you are at risk of Diabetes. Your HbA1c is 5.9, that mean you are at risk of Diabetes. Exercise 3-5 times a week for atleast for 30 minutes. Diet low in carbohydrate and fat.

## 2017-05-29 NOTE — DISCHARGE NOTE ADULT - MEDICATION SUMMARY - MEDICATIONS TO STOP TAKING
I will STOP taking the medications listed below when I get home from the hospital:     mg oral tablet  -- 1 tab(s) by mouth every 6 hours, As Needed - for moderate pain  -- Do not take this drug if you are pregnant.  It is very important that you take or use this exactly as directed.  Do not skip doses or discontinue unless directed by your doctor.  May cause drowsiness or dizziness.  Obtain medical advice before taking any non-prescription drugs as some may affect the action of this medication.  Take with food or milk.

## 2017-06-01 DIAGNOSIS — K59.00 CONSTIPATION, UNSPECIFIED: ICD-10-CM

## 2017-06-01 DIAGNOSIS — N17.9 ACUTE KIDNEY FAILURE, UNSPECIFIED: ICD-10-CM

## 2017-06-01 DIAGNOSIS — R00.0 TACHYCARDIA, UNSPECIFIED: ICD-10-CM

## 2017-06-01 DIAGNOSIS — M10.9 GOUT, UNSPECIFIED: ICD-10-CM

## 2017-06-01 DIAGNOSIS — J98.11 ATELECTASIS: ICD-10-CM

## 2017-06-01 DIAGNOSIS — Z91.013 ALLERGY TO SEAFOOD: ICD-10-CM

## 2017-06-01 DIAGNOSIS — D72.829 ELEVATED WHITE BLOOD CELL COUNT, UNSPECIFIED: ICD-10-CM

## 2017-06-01 DIAGNOSIS — N20.0 CALCULUS OF KIDNEY: ICD-10-CM

## 2017-06-01 DIAGNOSIS — R73.03 PREDIABETES: ICD-10-CM

## 2017-06-01 DIAGNOSIS — N13.30 UNSPECIFIED HYDRONEPHROSIS: ICD-10-CM

## 2017-06-01 DIAGNOSIS — N12 TUBULO-INTERSTITIAL NEPHRITIS, NOT SPECIFIED AS ACUTE OR CHRONIC: ICD-10-CM

## 2017-06-01 LAB
CULTURE RESULTS: SIGNIFICANT CHANGE UP
CULTURE RESULTS: SIGNIFICANT CHANGE UP
SPECIMEN SOURCE: SIGNIFICANT CHANGE UP
SPECIMEN SOURCE: SIGNIFICANT CHANGE UP

## 2017-06-04 DIAGNOSIS — N10 ACUTE PYELONEPHRITIS: ICD-10-CM

## 2017-06-04 DIAGNOSIS — N18.9 CHRONIC KIDNEY DISEASE, UNSPECIFIED: ICD-10-CM

## 2017-06-04 DIAGNOSIS — A41.9 SEPSIS, UNSPECIFIED ORGANISM: ICD-10-CM

## 2017-06-12 NOTE — ED PROVIDER NOTE - OTHER FINDINGS
[address] [facility] [address]  [phone]    June 23 2017 at 9:00am with Osiris.       You will return to [facility] in [place], [place]   
sinus tachycardia

## 2017-09-04 ENCOUNTER — EMERGENCY (EMERGENCY)
Facility: HOSPITAL | Age: 47
LOS: 1 days | Discharge: HOME | End: 2017-09-04
Attending: EMERGENCY MEDICINE
Payer: COMMERCIAL

## 2017-09-04 VITALS
WEIGHT: 218.04 LBS | OXYGEN SATURATION: 95 % | DIASTOLIC BLOOD PRESSURE: 81 MMHG | HEART RATE: 102 BPM | RESPIRATION RATE: 16 BRPM | TEMPERATURE: 98 F | SYSTOLIC BLOOD PRESSURE: 142 MMHG

## 2017-09-04 VITALS
OXYGEN SATURATION: 98 % | SYSTOLIC BLOOD PRESSURE: 134 MMHG | TEMPERATURE: 98 F | HEART RATE: 81 BPM | RESPIRATION RATE: 16 BRPM | DIASTOLIC BLOOD PRESSURE: 86 MMHG

## 2017-09-04 DIAGNOSIS — M10.9 GOUT, UNSPECIFIED: ICD-10-CM

## 2017-09-04 DIAGNOSIS — Z87.442 PERSONAL HISTORY OF URINARY CALCULI: ICD-10-CM

## 2017-09-04 DIAGNOSIS — R05 COUGH: ICD-10-CM

## 2017-09-04 DIAGNOSIS — E11.9 TYPE 2 DIABETES MELLITUS WITHOUT COMPLICATIONS: ICD-10-CM

## 2017-09-04 DIAGNOSIS — R50.9 FEVER, UNSPECIFIED: ICD-10-CM

## 2017-09-04 DIAGNOSIS — E65 LOCALIZED ADIPOSITY: ICD-10-CM

## 2017-09-04 LAB
ALBUMIN SERPL ELPH-MCNC: 3.4 G/DL — LOW (ref 3.5–5)
ALP SERPL-CCNC: 60 U/L — SIGNIFICANT CHANGE UP (ref 40–120)
ALT FLD-CCNC: 30 U/L DA — SIGNIFICANT CHANGE UP (ref 10–60)
ANION GAP SERPL CALC-SCNC: 7 MMOL/L — SIGNIFICANT CHANGE UP (ref 5–17)
APPEARANCE UR: CLEAR — SIGNIFICANT CHANGE UP
AST SERPL-CCNC: 24 U/L — SIGNIFICANT CHANGE UP (ref 10–40)
BILIRUB SERPL-MCNC: 0.5 MG/DL — SIGNIFICANT CHANGE UP (ref 0.2–1.2)
BILIRUB UR-MCNC: NEGATIVE — SIGNIFICANT CHANGE UP
BUN SERPL-MCNC: 16 MG/DL — SIGNIFICANT CHANGE UP (ref 7–18)
CALCIUM SERPL-MCNC: 8.5 MG/DL — SIGNIFICANT CHANGE UP (ref 8.4–10.5)
CHLORIDE SERPL-SCNC: 103 MMOL/L — SIGNIFICANT CHANGE UP (ref 96–108)
CO2 SERPL-SCNC: 29 MMOL/L — SIGNIFICANT CHANGE UP (ref 22–31)
COLOR SPEC: YELLOW — SIGNIFICANT CHANGE UP
CREAT SERPL-MCNC: 1 MG/DL — SIGNIFICANT CHANGE UP (ref 0.5–1.3)
DIFF PNL FLD: ABNORMAL
GLUCOSE SERPL-MCNC: 100 MG/DL — HIGH (ref 70–99)
GLUCOSE UR QL: NEGATIVE — SIGNIFICANT CHANGE UP
HCT VFR BLD CALC: 48 % — SIGNIFICANT CHANGE UP (ref 39–50)
HGB BLD-MCNC: 16.2 G/DL — SIGNIFICANT CHANGE UP (ref 13–17)
KETONES UR-MCNC: NEGATIVE — SIGNIFICANT CHANGE UP
LEUKOCYTE ESTERASE UR-ACNC: NEGATIVE — SIGNIFICANT CHANGE UP
MCHC RBC-ENTMCNC: 29.6 PG — SIGNIFICANT CHANGE UP (ref 27–34)
MCHC RBC-ENTMCNC: 33.7 GM/DL — SIGNIFICANT CHANGE UP (ref 32–36)
MCV RBC AUTO: 87.9 FL — SIGNIFICANT CHANGE UP (ref 80–100)
NITRITE UR-MCNC: NEGATIVE — SIGNIFICANT CHANGE UP
PH UR: 6 — SIGNIFICANT CHANGE UP (ref 5–8)
PLATELET # BLD AUTO: 272 K/UL — SIGNIFICANT CHANGE UP (ref 150–400)
POTASSIUM SERPL-MCNC: 3.9 MMOL/L — SIGNIFICANT CHANGE UP (ref 3.5–5.3)
POTASSIUM SERPL-SCNC: 3.9 MMOL/L — SIGNIFICANT CHANGE UP (ref 3.5–5.3)
PROT SERPL-MCNC: 7.3 G/DL — SIGNIFICANT CHANGE UP (ref 6–8.3)
PROT UR-MCNC: 30 MG/DL
RBC # BLD: 5.45 M/UL — SIGNIFICANT CHANGE UP (ref 4.2–5.8)
RBC # FLD: 12.2 % — SIGNIFICANT CHANGE UP (ref 10.3–14.5)
SODIUM SERPL-SCNC: 139 MMOL/L — SIGNIFICANT CHANGE UP (ref 135–145)
SP GR SPEC: 1.02 — SIGNIFICANT CHANGE UP (ref 1.01–1.02)
UROBILINOGEN FLD QL: NEGATIVE — SIGNIFICANT CHANGE UP
WBC # BLD: 9.9 K/UL — SIGNIFICANT CHANGE UP (ref 3.8–10.5)
WBC # FLD AUTO: 9.9 K/UL — SIGNIFICANT CHANGE UP (ref 3.8–10.5)

## 2017-09-04 PROCEDURE — 81001 URINALYSIS AUTO W/SCOPE: CPT

## 2017-09-04 PROCEDURE — 74177 CT ABD & PELVIS W/CONTRAST: CPT

## 2017-09-04 PROCEDURE — 87086 URINE CULTURE/COLONY COUNT: CPT

## 2017-09-04 PROCEDURE — 87186 SC STD MICRODIL/AGAR DIL: CPT

## 2017-09-04 PROCEDURE — 74177 CT ABD & PELVIS W/CONTRAST: CPT | Mod: 26

## 2017-09-04 PROCEDURE — 96374 THER/PROPH/DIAG INJ IV PUSH: CPT | Mod: 59

## 2017-09-04 PROCEDURE — 85027 COMPLETE CBC AUTOMATED: CPT

## 2017-09-04 PROCEDURE — 99285 EMERGENCY DEPT VISIT HI MDM: CPT | Mod: 25

## 2017-09-04 PROCEDURE — 71046 X-RAY EXAM CHEST 2 VIEWS: CPT

## 2017-09-04 PROCEDURE — 83690 ASSAY OF LIPASE: CPT

## 2017-09-04 PROCEDURE — 80053 COMPREHEN METABOLIC PANEL: CPT

## 2017-09-04 PROCEDURE — 86308 HETEROPHILE ANTIBODY SCREEN: CPT

## 2017-09-04 PROCEDURE — 96375 TX/PRO/DX INJ NEW DRUG ADDON: CPT

## 2017-09-04 PROCEDURE — 94640 AIRWAY INHALATION TREATMENT: CPT

## 2017-09-04 PROCEDURE — 71020: CPT | Mod: 26

## 2017-09-04 PROCEDURE — 99284 EMERGENCY DEPT VISIT MOD MDM: CPT | Mod: 25

## 2017-09-04 RX ORDER — SODIUM CHLORIDE 9 MG/ML
1000 INJECTION INTRAMUSCULAR; INTRAVENOUS; SUBCUTANEOUS ONCE
Qty: 0 | Refills: 0 | Status: COMPLETED | OUTPATIENT
Start: 2017-09-04 | End: 2017-09-04

## 2017-09-04 RX ORDER — FAMOTIDINE 10 MG/ML
20 INJECTION INTRAVENOUS ONCE
Qty: 0 | Refills: 0 | Status: COMPLETED | OUTPATIENT
Start: 2017-09-04 | End: 2017-09-04

## 2017-09-04 RX ORDER — FLUTICASONE PROPIONATE 50 MCG
1 SPRAY, SUSPENSION NASAL
Qty: 0 | Refills: 0 | Status: DISCONTINUED | OUTPATIENT
Start: 2017-09-04 | End: 2017-09-08

## 2017-09-04 RX ORDER — MORPHINE SULFATE 50 MG/1
4 CAPSULE, EXTENDED RELEASE ORAL ONCE
Qty: 0 | Refills: 0 | Status: DISCONTINUED | OUTPATIENT
Start: 2017-09-04 | End: 2017-09-04

## 2017-09-04 RX ADMIN — SODIUM CHLORIDE 1000 MILLILITER(S): 9 INJECTION INTRAMUSCULAR; INTRAVENOUS; SUBCUTANEOUS at 07:10

## 2017-09-04 RX ADMIN — FAMOTIDINE 20 MILLIGRAM(S): 10 INJECTION INTRAVENOUS at 09:09

## 2017-09-04 RX ADMIN — Medication 1 SPRAY(S): at 09:10

## 2017-09-04 RX ADMIN — MORPHINE SULFATE 4 MILLIGRAM(S): 50 CAPSULE, EXTENDED RELEASE ORAL at 09:09

## 2017-09-04 RX ADMIN — MORPHINE SULFATE 4 MILLIGRAM(S): 50 CAPSULE, EXTENDED RELEASE ORAL at 09:40

## 2017-09-04 NOTE — ED PROVIDER NOTE - OBJECTIVE STATEMENT
47 y/o male with PMHx of kidney stones, DM, gout, pancreatitis presents to the ED c/o bilateral lower abd pain x 2 days. Pt notes associated Sx of upper back pain. Pt notes he [facility] to the [facility] week for upper respiratory issues (sore throat, cough, nasal congestion) and was give amoxicillin and prednisone (4 doses and 1 dose left respectively). Pt still has respiratory Sx. Pt denies vomiting, diarrhea, burning urination, testicular swelling/pain, or any other complaints. Pt had a nml BM this morning. NKDA.

## 2017-09-04 NOTE — ED ADULT NURSE NOTE - OBJECTIVE STATEMENT
pt. is aoz3 came to c/o abdominal pain x 2-3 day but was worse today. pt states pain radiates to the back also c/o weakness. pt was seen by attending. labs drawn sent. also with cough an still on antibiotic therapy

## 2017-09-04 NOTE — ED PROVIDER NOTE - MEDICAL DECISION MAKING DETAILS
45 y/o male presents to the ED c/o lower abd pain x 2 days. Will get basic labs, UA, CT abd pelvis, CXR and reassess.

## 2017-09-05 LAB — HETEROPH AB TITR SER AGGL: NEGATIVE — SIGNIFICANT CHANGE UP

## 2018-03-11 ENCOUNTER — EMERGENCY (EMERGENCY)
Facility: HOSPITAL | Age: 48
LOS: 1 days | Discharge: HOME | End: 2018-03-11
Attending: EMERGENCY MEDICINE
Payer: COMMERCIAL

## 2018-03-11 VITALS
OXYGEN SATURATION: 95 % | RESPIRATION RATE: 17 BRPM | SYSTOLIC BLOOD PRESSURE: 113 MMHG | TEMPERATURE: 98 F | DIASTOLIC BLOOD PRESSURE: 72 MMHG | HEART RATE: 93 BPM

## 2018-03-11 VITALS
HEART RATE: 105 BPM | DIASTOLIC BLOOD PRESSURE: 88 MMHG | HEIGHT: 69 IN | TEMPERATURE: 99 F | OXYGEN SATURATION: 96 % | RESPIRATION RATE: 18 BRPM | SYSTOLIC BLOOD PRESSURE: 127 MMHG | WEIGHT: 220.02 LBS

## 2018-03-11 LAB
ALBUMIN SERPL ELPH-MCNC: 3.5 G/DL — SIGNIFICANT CHANGE UP (ref 3.5–5)
ALP SERPL-CCNC: 65 U/L — SIGNIFICANT CHANGE UP (ref 40–120)
ALT FLD-CCNC: 28 U/L DA — SIGNIFICANT CHANGE UP (ref 10–60)
ANION GAP SERPL CALC-SCNC: 8 MMOL/L — SIGNIFICANT CHANGE UP (ref 5–17)
AST SERPL-CCNC: 14 U/L — SIGNIFICANT CHANGE UP (ref 10–40)
BASOPHILS # BLD AUTO: 0 K/UL — SIGNIFICANT CHANGE UP (ref 0–0.2)
BASOPHILS NFR BLD AUTO: 0.4 % — SIGNIFICANT CHANGE UP (ref 0–2)
BILIRUB SERPL-MCNC: 0.4 MG/DL — SIGNIFICANT CHANGE UP (ref 0.2–1.2)
BUN SERPL-MCNC: 14 MG/DL — SIGNIFICANT CHANGE UP (ref 7–18)
CALCIUM SERPL-MCNC: 8.5 MG/DL — SIGNIFICANT CHANGE UP (ref 8.4–10.5)
CHLORIDE SERPL-SCNC: 105 MMOL/L — SIGNIFICANT CHANGE UP (ref 96–108)
CO2 SERPL-SCNC: 26 MMOL/L — SIGNIFICANT CHANGE UP (ref 22–31)
CREAT SERPL-MCNC: 1.21 MG/DL — SIGNIFICANT CHANGE UP (ref 0.5–1.3)
EOSINOPHIL # BLD AUTO: 0.2 K/UL — SIGNIFICANT CHANGE UP (ref 0–0.5)
EOSINOPHIL NFR BLD AUTO: 2.6 % — SIGNIFICANT CHANGE UP (ref 0–6)
ERYTHROCYTE [SEDIMENTATION RATE] IN BLOOD: 11 MM/HR — SIGNIFICANT CHANGE UP (ref 0–15)
GLUCOSE SERPL-MCNC: 119 MG/DL — HIGH (ref 70–99)
HCT VFR BLD CALC: 56.4 % — HIGH (ref 39–50)
HGB BLD-MCNC: 17.6 G/DL — HIGH (ref 13–17)
LIDOCAIN IGE QN: 145 U/L — SIGNIFICANT CHANGE UP (ref 73–393)
LYMPHOCYTES # BLD AUTO: 1.5 K/UL — SIGNIFICANT CHANGE UP (ref 1–3.3)
LYMPHOCYTES # BLD AUTO: 19.8 % — SIGNIFICANT CHANGE UP (ref 13–44)
MCHC RBC-ENTMCNC: 27.9 PG — SIGNIFICANT CHANGE UP (ref 27–34)
MCHC RBC-ENTMCNC: 31.3 GM/DL — LOW (ref 32–36)
MCV RBC AUTO: 89.3 FL — SIGNIFICANT CHANGE UP (ref 80–100)
MONOCYTES # BLD AUTO: 1.2 K/UL — HIGH (ref 0–0.9)
MONOCYTES NFR BLD AUTO: 16.2 % — HIGH (ref 2–14)
NEUTROPHILS # BLD AUTO: 4.5 K/UL — SIGNIFICANT CHANGE UP (ref 1.8–7.4)
NEUTROPHILS NFR BLD AUTO: 61 % — SIGNIFICANT CHANGE UP (ref 43–77)
PLATELET # BLD AUTO: 254 K/UL — SIGNIFICANT CHANGE UP (ref 150–400)
POTASSIUM SERPL-MCNC: 3.6 MMOL/L — SIGNIFICANT CHANGE UP (ref 3.5–5.3)
POTASSIUM SERPL-SCNC: 3.6 MMOL/L — SIGNIFICANT CHANGE UP (ref 3.5–5.3)
PROT SERPL-MCNC: 7.7 G/DL — SIGNIFICANT CHANGE UP (ref 6–8.3)
RBC # BLD: 6.31 M/UL — HIGH (ref 4.2–5.8)
RBC # FLD: 12.5 % — SIGNIFICANT CHANGE UP (ref 10.3–14.5)
SODIUM SERPL-SCNC: 139 MMOL/L — SIGNIFICANT CHANGE UP (ref 135–145)
URATE SERPL-MCNC: 8.6 MG/DL — SIGNIFICANT CHANGE UP (ref 3.4–8.8)
WBC # BLD: 7.4 K/UL — SIGNIFICANT CHANGE UP (ref 3.8–10.5)
WBC # FLD AUTO: 7.4 K/UL — SIGNIFICANT CHANGE UP (ref 3.8–10.5)

## 2018-03-11 PROCEDURE — 85652 RBC SED RATE AUTOMATED: CPT

## 2018-03-11 PROCEDURE — 80053 COMPREHEN METABOLIC PANEL: CPT

## 2018-03-11 PROCEDURE — 84550 ASSAY OF BLOOD/URIC ACID: CPT

## 2018-03-11 PROCEDURE — 96374 THER/PROPH/DIAG INJ IV PUSH: CPT

## 2018-03-11 PROCEDURE — 85027 COMPLETE CBC AUTOMATED: CPT

## 2018-03-11 PROCEDURE — 83690 ASSAY OF LIPASE: CPT

## 2018-03-11 PROCEDURE — 99284 EMERGENCY DEPT VISIT MOD MDM: CPT | Mod: 25

## 2018-03-11 RX ORDER — SODIUM CHLORIDE 9 MG/ML
1000 INJECTION INTRAMUSCULAR; INTRAVENOUS; SUBCUTANEOUS ONCE
Qty: 0 | Refills: 0 | Status: COMPLETED | OUTPATIENT
Start: 2018-03-11 | End: 2018-03-11

## 2018-03-11 RX ORDER — CIPROFLOXACIN LACTATE 400MG/40ML
750 VIAL (ML) INTRAVENOUS ONCE
Qty: 0 | Refills: 0 | Status: COMPLETED | OUTPATIENT
Start: 2018-03-11 | End: 2018-03-11

## 2018-03-11 RX ORDER — ONDANSETRON 8 MG/1
4 TABLET, FILM COATED ORAL ONCE
Qty: 0 | Refills: 0 | Status: COMPLETED | OUTPATIENT
Start: 2018-03-11 | End: 2018-03-11

## 2018-03-11 RX ADMIN — SODIUM CHLORIDE 1000 MILLILITER(S): 9 INJECTION INTRAMUSCULAR; INTRAVENOUS; SUBCUTANEOUS at 04:45

## 2018-03-11 RX ADMIN — Medication 750 MILLIGRAM(S): at 03:51

## 2018-03-11 RX ADMIN — SODIUM CHLORIDE 1000 MILLILITER(S): 9 INJECTION INTRAMUSCULAR; INTRAVENOUS; SUBCUTANEOUS at 03:06

## 2018-03-11 RX ADMIN — ONDANSETRON 4 MILLIGRAM(S): 8 TABLET, FILM COATED ORAL at 03:06

## 2018-03-11 NOTE — ED ADULT TRIAGE NOTE - CHIEF COMPLAINT QUOTE
c/o abd pain and diarrhea x 2 days, pt states he just got back from [place], pt also states he had an injection for right foot pain and swelling in [place]

## 2018-03-11 NOTE — ED PROVIDER NOTE - MEDICAL DECISION MAKING DETAILS
47 yr old male with hx of kidney stones, DM, gout, pancreatitis presents to the ED c/o diffuse diarrhea x 2 days with nausea NO vomiting and abd cramping x 2 days.  just came back from [place].  pt also c/o right foot pain and had injection at [place].  no fever, no chills, no visual changes, no headache, no numbness or tingling, no sob, no cough, no cp, no palpitations, no leg swelling, no syncope, no vomiting, no dysuria, no rashes    pt with travelers diarrhea- obtain labs, fluids, cipro 750mg x1.  no concern for appy or biliary path or hepatitis.

## 2018-03-11 NOTE — ED PROVIDER NOTE - MUSCULOSKELETAL, MLM
Spine appears normal, range of motion is not limited, no muscle or joint tenderness- right 1st metatarsal joint bone spurt no sign of gout or inflammation

## 2018-03-11 NOTE — ED PROVIDER NOTE - OBJECTIVE STATEMENT
47 yr old male with hx of kidney stones, DM, gout, pancreatitis presents to the ED c/o diffuse diarrhea x 2 days with nausea NO vomiting and abd cramping x 2 days.  just came back from [place].  pt also c/o right foot pain and had injection at [place].  no fever, no chills, no visual changes, no headache, no numbness or tingling, no sob, no cough, no cp, no palpitations, no leg swelling, no syncope, no vomiting, no dysuria, no rashes

## 2018-06-24 ENCOUNTER — EMERGENCY (EMERGENCY)
Facility: HOSPITAL | Age: 48
LOS: 1 days | Discharge: HOME | End: 2018-06-24
Attending: EMERGENCY MEDICINE
Payer: COMMERCIAL

## 2018-06-24 VITALS
SYSTOLIC BLOOD PRESSURE: 135 MMHG | TEMPERATURE: 103 F | RESPIRATION RATE: 18 BRPM | OXYGEN SATURATION: 94 % | DIASTOLIC BLOOD PRESSURE: 85 MMHG | HEART RATE: 123 BPM

## 2018-06-24 VITALS
HEART RATE: 106 BPM | RESPIRATION RATE: 17 BRPM | TEMPERATURE: 100 F | OXYGEN SATURATION: 98 % | DIASTOLIC BLOOD PRESSURE: 67 MMHG | SYSTOLIC BLOOD PRESSURE: 124 MMHG

## 2018-06-24 LAB
ALBUMIN SERPL ELPH-MCNC: 3.3 G/DL — LOW (ref 3.5–5)
ALP SERPL-CCNC: 71 U/L — SIGNIFICANT CHANGE UP (ref 40–120)
ALT FLD-CCNC: 29 U/L DA — SIGNIFICANT CHANGE UP (ref 10–60)
ANION GAP SERPL CALC-SCNC: 7 MMOL/L — SIGNIFICANT CHANGE UP (ref 5–17)
APPEARANCE UR: CLEAR — SIGNIFICANT CHANGE UP
AST SERPL-CCNC: 16 U/L — SIGNIFICANT CHANGE UP (ref 10–40)
BILIRUB SERPL-MCNC: 0.4 MG/DL — SIGNIFICANT CHANGE UP (ref 0.2–1.2)
BILIRUB UR-MCNC: NEGATIVE — SIGNIFICANT CHANGE UP
BUN SERPL-MCNC: 14 MG/DL — SIGNIFICANT CHANGE UP (ref 7–18)
CALCIUM SERPL-MCNC: 8.7 MG/DL — SIGNIFICANT CHANGE UP (ref 8.4–10.5)
CHLORIDE SERPL-SCNC: 103 MMOL/L — SIGNIFICANT CHANGE UP (ref 96–108)
CO2 SERPL-SCNC: 29 MMOL/L — SIGNIFICANT CHANGE UP (ref 22–31)
COLOR SPEC: YELLOW — SIGNIFICANT CHANGE UP
CREAT SERPL-MCNC: 1.15 MG/DL — SIGNIFICANT CHANGE UP (ref 0.5–1.3)
DIFF PNL FLD: ABNORMAL
GLUCOSE SERPL-MCNC: 136 MG/DL — HIGH (ref 70–99)
GLUCOSE UR QL: NEGATIVE — SIGNIFICANT CHANGE UP
HCT VFR BLD CALC: 47.2 % — SIGNIFICANT CHANGE UP (ref 39–50)
HGB BLD-MCNC: 15.2 G/DL — SIGNIFICANT CHANGE UP (ref 13–17)
KETONES UR-MCNC: NEGATIVE — SIGNIFICANT CHANGE UP
LACTATE SERPL-SCNC: 1.4 MMOL/L — SIGNIFICANT CHANGE UP (ref 0.7–2)
LEUKOCYTE ESTERASE UR-ACNC: ABNORMAL
LYMPHOCYTES # BLD AUTO: 8 % — LOW (ref 13–44)
MCHC RBC-ENTMCNC: 28.8 PG — SIGNIFICANT CHANGE UP (ref 27–34)
MCHC RBC-ENTMCNC: 32.1 GM/DL — SIGNIFICANT CHANGE UP (ref 32–36)
MCV RBC AUTO: 89.6 FL — SIGNIFICANT CHANGE UP (ref 80–100)
MONOCYTES NFR BLD AUTO: 12 % — SIGNIFICANT CHANGE UP (ref 2–14)
NEUTROPHILS NFR BLD AUTO: 76 % — SIGNIFICANT CHANGE UP (ref 43–77)
NITRITE UR-MCNC: NEGATIVE — SIGNIFICANT CHANGE UP
PH UR: 6 — SIGNIFICANT CHANGE UP (ref 5–8)
PLATELET # BLD AUTO: 237 K/UL — SIGNIFICANT CHANGE UP (ref 150–400)
POTASSIUM SERPL-MCNC: 3.7 MMOL/L — SIGNIFICANT CHANGE UP (ref 3.5–5.3)
POTASSIUM SERPL-SCNC: 3.7 MMOL/L — SIGNIFICANT CHANGE UP (ref 3.5–5.3)
PROT SERPL-MCNC: 7.5 G/DL — SIGNIFICANT CHANGE UP (ref 6–8.3)
PROT UR-MCNC: 30 MG/DL
RBC # BLD: 5.27 M/UL — SIGNIFICANT CHANGE UP (ref 4.2–5.8)
RBC # FLD: 12.4 % — SIGNIFICANT CHANGE UP (ref 10.3–14.5)
SODIUM SERPL-SCNC: 139 MMOL/L — SIGNIFICANT CHANGE UP (ref 135–145)
SP GR SPEC: 1.01 — SIGNIFICANT CHANGE UP (ref 1.01–1.02)
UROBILINOGEN FLD QL: NEGATIVE — SIGNIFICANT CHANGE UP
WBC # BLD: 15.6 K/UL — HIGH (ref 3.8–10.5)
WBC # FLD AUTO: 15.6 K/UL — HIGH (ref 3.8–10.5)

## 2018-06-24 PROCEDURE — 71045 X-RAY EXAM CHEST 1 VIEW: CPT

## 2018-06-24 PROCEDURE — 99285 EMERGENCY DEPT VISIT HI MDM: CPT

## 2018-06-24 PROCEDURE — 87086 URINE CULTURE/COLONY COUNT: CPT

## 2018-06-24 PROCEDURE — 83605 ASSAY OF LACTIC ACID: CPT

## 2018-06-24 PROCEDURE — 80053 COMPREHEN METABOLIC PANEL: CPT

## 2018-06-24 PROCEDURE — 71045 X-RAY EXAM CHEST 1 VIEW: CPT | Mod: 26

## 2018-06-24 PROCEDURE — 87040 BLOOD CULTURE FOR BACTERIA: CPT

## 2018-06-24 PROCEDURE — 99284 EMERGENCY DEPT VISIT MOD MDM: CPT | Mod: 25

## 2018-06-24 PROCEDURE — 85027 COMPLETE CBC AUTOMATED: CPT

## 2018-06-24 PROCEDURE — 87186 SC STD MICRODIL/AGAR DIL: CPT

## 2018-06-24 PROCEDURE — 81001 URINALYSIS AUTO W/SCOPE: CPT

## 2018-06-24 PROCEDURE — 96374 THER/PROPH/DIAG INJ IV PUSH: CPT

## 2018-06-24 RX ORDER — ACETAMINOPHEN 500 MG
650 TABLET ORAL ONCE
Qty: 0 | Refills: 0 | Status: COMPLETED | OUTPATIENT
Start: 2018-06-24 | End: 2018-06-24

## 2018-06-24 RX ORDER — CEFTRIAXONE 500 MG/1
1 INJECTION, POWDER, FOR SOLUTION INTRAMUSCULAR; INTRAVENOUS ONCE
Qty: 0 | Refills: 0 | Status: COMPLETED | OUTPATIENT
Start: 2018-06-24 | End: 2018-06-24

## 2018-06-24 RX ORDER — SODIUM CHLORIDE 9 MG/ML
2000 INJECTION INTRAMUSCULAR; INTRAVENOUS; SUBCUTANEOUS ONCE
Qty: 0 | Refills: 0 | Status: COMPLETED | OUTPATIENT
Start: 2018-06-24 | End: 2018-06-24

## 2018-06-24 RX ORDER — CEFPODOXIME PROXETIL 100 MG
1 TABLET ORAL
Qty: 20 | Refills: 0 | OUTPATIENT
Start: 2018-06-24 | End: 2018-07-03

## 2018-06-24 RX ADMIN — SODIUM CHLORIDE 2000 MILLILITER(S): 9 INJECTION INTRAMUSCULAR; INTRAVENOUS; SUBCUTANEOUS at 17:39

## 2018-06-24 RX ADMIN — CEFTRIAXONE 100 GRAM(S): 500 INJECTION, POWDER, FOR SOLUTION INTRAMUSCULAR; INTRAVENOUS at 18:27

## 2018-06-24 RX ADMIN — Medication 650 MILLIGRAM(S): at 17:39

## 2018-06-24 RX ADMIN — Medication 650 MILLIGRAM(S): at 18:08

## 2018-06-24 NOTE — ED PROVIDER NOTE - PROGRESS NOTE DETAILS
Feeling much better, mild residual pain.  We had extensive convo/SDM regarding dispo.  Offered admit. Pt preferred dc and will see his  and PMD with close interval f/u. Cefpodoxime rx sent and cultures are in lab. Understands he will need to return if any worsening and must copmlete Abx.

## 2018-06-24 NOTE — ED PROVIDER NOTE - MEDICAL DECISION MAKING DETAILS
Pt p/w fevers, abd pain, urinary complaints and CVAT s/p  instrumentation. Consider Pyelo most likely, no URI Sx.  Will send labs, CXR, provide Sx Tx and  reassess.

## 2018-06-24 NOTE — ED ADULT NURSE NOTE - OBJECTIVE STATEMENT
arrived ambulatory c/o back pain fever/chills hematuria and dysuria onset 3 days denies C/P abd pain.

## 2018-06-24 NOTE — ED PROVIDER NOTE - OBJECTIVE STATEMENT
47 yr old M c h/o enlarged prostate, diabetes, microscopic hematuria, currently undergoing eval, who pw 1 week of fevers, back pain, dysuria, abd pain and myalgias.  No N/V/D.  No cough or sob. Had recent urethral and prostate instrumentation via .  States he was on ABx for 3 days last week. NO trauma, no recent travel , no known sick contacts. 47 yr old M c h/o enlarged prostate, diabetes, microscopic hematuria, currently undergoing eval, who pw 1 week of fevers, back pain, dysuria, abd pain and myalgias.  Pain is constant, not intermittent. No N/V/D.  No cough or sob. Had recent urethral and prostate instrumentation via .  States he was on ABx for 3 days last week. NO trauma, no recent travel , no known sick contacts.

## 2018-06-26 LAB
-  CEFTAZIDIME: SIGNIFICANT CHANGE UP
-  LEVOFLOXACIN: SIGNIFICANT CHANGE UP
-  TRIMETHOPRIM/SULFAMETHOXAZOLE: SIGNIFICANT CHANGE UP
CULTURE RESULTS: SIGNIFICANT CHANGE UP
METHOD TYPE: SIGNIFICANT CHANGE UP
ORGANISM # SPEC MICROSCOPIC CNT: SIGNIFICANT CHANGE UP
ORGANISM # SPEC MICROSCOPIC CNT: SIGNIFICANT CHANGE UP
SPECIMEN SOURCE: SIGNIFICANT CHANGE UP

## 2018-07-16 ENCOUNTER — EMERGENCY (EMERGENCY)
Facility: HOSPITAL | Age: 48
LOS: 1 days | Discharge: HOME | End: 2018-07-16
Attending: EMERGENCY MEDICINE
Payer: COMMERCIAL

## 2018-07-16 VITALS
RESPIRATION RATE: 20 BRPM | SYSTOLIC BLOOD PRESSURE: 145 MMHG | TEMPERATURE: 98 F | WEIGHT: 205.03 LBS | HEART RATE: 88 BPM | DIASTOLIC BLOOD PRESSURE: 90 MMHG | OXYGEN SATURATION: 99 % | HEIGHT: 69 IN

## 2018-07-16 PROBLEM — M10.9 GOUT, UNSPECIFIED: Chronic | Status: ACTIVE | Noted: 2017-05-24

## 2018-07-16 PROBLEM — N20.0 CALCULUS OF KIDNEY: Chronic | Status: ACTIVE | Noted: 2017-10-02

## 2018-07-16 PROBLEM — K85.90 ACUTE PANCREATITIS WITHOUT NECROSIS OR INFECTION, UNSPECIFIED: Chronic | Status: ACTIVE | Noted: 2017-10-02

## 2018-07-16 PROCEDURE — 90471 IMMUNIZATION ADMIN: CPT

## 2018-07-16 PROCEDURE — 99284 EMERGENCY DEPT VISIT MOD MDM: CPT

## 2018-07-16 PROCEDURE — 90715 TDAP VACCINE 7 YRS/> IM: CPT

## 2018-07-16 PROCEDURE — 99283 EMERGENCY DEPT VISIT LOW MDM: CPT | Mod: 25

## 2018-07-16 RX ORDER — INDOMETHACIN 50 MG
50 CAPSULE ORAL ONCE
Qty: 0 | Refills: 0 | Status: COMPLETED | OUTPATIENT
Start: 2018-07-16 | End: 2018-07-16

## 2018-07-16 RX ORDER — TETANUS TOXOID, REDUCED DIPHTHERIA TOXOID AND ACELLULAR PERTUSSIS VACCINE, ADSORBED 5; 2.5; 8; 8; 2.5 [IU]/.5ML; [IU]/.5ML; UG/.5ML; UG/.5ML; UG/.5ML
0.5 SUSPENSION INTRAMUSCULAR ONCE
Qty: 0 | Refills: 0 | Status: COMPLETED | OUTPATIENT
Start: 2018-07-16 | End: 2018-07-16

## 2018-07-16 RX ORDER — INDOMETHACIN 50 MG
1 CAPSULE ORAL
Qty: 21 | Refills: 0 | OUTPATIENT
Start: 2018-07-16 | End: 2018-07-22

## 2018-07-16 RX ADMIN — Medication 50 MILLIGRAM(S): at 10:00

## 2018-07-16 RX ADMIN — TETANUS TOXOID, REDUCED DIPHTHERIA TOXOID AND ACELLULAR PERTUSSIS VACCINE, ADSORBED 0.5 MILLILITER(S): 5; 2.5; 8; 8; 2.5 SUSPENSION INTRAMUSCULAR at 11:13

## 2018-07-16 NOTE — ED PROVIDER NOTE - OBJECTIVE STATEMENT
47 year old M Pt w/ PMHx of DM, Gout presents to ED c/o left big toe pain. Pt reports symptoms are similar to prior episodes of gout. Pt denies fever, chills, and any other complaints. Pt also reports a scrape on bottom of left foot. Tetanus is not UTD. NKDA 47 year old M Pt w/ PMHx of DM, Gout presents to ED c/o left big toe pain. Pt reports symptoms are similar to prior episodes of gout. Pt denies fever, chills, and any other complaints. Pt also reports a scrape on bottom of left foot last week, that has healed. Tetanus is not UTD. NKDA

## 2018-07-16 NOTE — ED PROVIDER NOTE - MEDICAL DECISION MAKING DETAILS
analgesia, anticipatory guidance. updated tdap.  Discussed anticipatory guidance and return precautions.    Dc with outpt follow up.

## 2018-07-16 NOTE — ED ADULT NURSE NOTE - OBJECTIVE STATEMENT
Patient presents to ED c/o left great toe swelling and pain. patient stated" it started yesterday pain scale 8/10 and more painful with movement,.

## 2018-07-16 NOTE — ED PROVIDER NOTE - MUSCULOSKELETAL, MLM
Left big toe warm, no redness, not swollen, tenderness to palpation at base of first big toe on Left

## 2018-08-15 NOTE — PATIENT PROFILE ADULT. - PAIN CHRONIC, PROFILE
Unna Boot Text: An Unna boot was placed to help immobilize the limb and facilitate more rapid healing. no

## 2019-04-29 ENCOUNTER — INPATIENT (INPATIENT)
Facility: HOSPITAL | Age: 49
LOS: 2 days | Discharge: HOME | DRG: 439 | End: 2019-05-02
Attending: INTERNAL MEDICINE | Admitting: INTERNAL MEDICINE
Payer: COMMERCIAL

## 2019-04-29 VITALS
TEMPERATURE: 98 F | HEART RATE: 92 BPM | DIASTOLIC BLOOD PRESSURE: 84 MMHG | SYSTOLIC BLOOD PRESSURE: 153 MMHG | OXYGEN SATURATION: 100 % | HEIGHT: 70 IN | WEIGHT: 210.1 LBS | RESPIRATION RATE: 18 BRPM

## 2019-04-29 DIAGNOSIS — Z29.9 ENCOUNTER FOR PROPHYLACTIC MEASURES, UNSPECIFIED: ICD-10-CM

## 2019-04-29 DIAGNOSIS — K85.90 ACUTE PANCREATITIS WITHOUT NECROSIS OR INFECTION, UNSPECIFIED: ICD-10-CM

## 2019-04-29 DIAGNOSIS — M10.9 GOUT, UNSPECIFIED: ICD-10-CM

## 2019-04-29 LAB
ALBUMIN SERPL ELPH-MCNC: 3.9 G/DL — SIGNIFICANT CHANGE UP (ref 3.5–5)
ALP SERPL-CCNC: 70 U/L — SIGNIFICANT CHANGE UP (ref 40–120)
ALT FLD-CCNC: 28 U/L DA — SIGNIFICANT CHANGE UP (ref 10–60)
ANION GAP SERPL CALC-SCNC: 5 MMOL/L — SIGNIFICANT CHANGE UP (ref 5–17)
APPEARANCE UR: CLEAR — SIGNIFICANT CHANGE UP
AST SERPL-CCNC: 12 U/L — SIGNIFICANT CHANGE UP (ref 10–40)
BASOPHILS # BLD AUTO: 0.02 K/UL — SIGNIFICANT CHANGE UP (ref 0–0.2)
BASOPHILS NFR BLD AUTO: 0.2 % — SIGNIFICANT CHANGE UP (ref 0–2)
BILIRUB SERPL-MCNC: 0.5 MG/DL — SIGNIFICANT CHANGE UP (ref 0.2–1.2)
BILIRUB UR-MCNC: NEGATIVE — SIGNIFICANT CHANGE UP
BUN SERPL-MCNC: 13 MG/DL — SIGNIFICANT CHANGE UP (ref 7–18)
CALCIUM SERPL-MCNC: 8.8 MG/DL — SIGNIFICANT CHANGE UP (ref 8.4–10.5)
CHLORIDE SERPL-SCNC: 101 MMOL/L — SIGNIFICANT CHANGE UP (ref 96–108)
CO2 SERPL-SCNC: 30 MMOL/L — SIGNIFICANT CHANGE UP (ref 22–31)
COLOR SPEC: YELLOW — SIGNIFICANT CHANGE UP
CREAT SERPL-MCNC: 1.02 MG/DL — SIGNIFICANT CHANGE UP (ref 0.5–1.3)
DIFF PNL FLD: ABNORMAL
EOSINOPHIL # BLD AUTO: 0.06 K/UL — SIGNIFICANT CHANGE UP (ref 0–0.5)
EOSINOPHIL NFR BLD AUTO: 0.5 % — SIGNIFICANT CHANGE UP (ref 0–6)
GLUCOSE SERPL-MCNC: 126 MG/DL — HIGH (ref 70–99)
GLUCOSE UR QL: NEGATIVE — SIGNIFICANT CHANGE UP
HCT VFR BLD CALC: 51.1 % — HIGH (ref 39–50)
HGB BLD-MCNC: 16.7 G/DL — SIGNIFICANT CHANGE UP (ref 13–17)
IMM GRANULOCYTES NFR BLD AUTO: 0.4 % — SIGNIFICANT CHANGE UP (ref 0–1.5)
KETONES UR-MCNC: NEGATIVE — SIGNIFICANT CHANGE UP
LEUKOCYTE ESTERASE UR-ACNC: NEGATIVE — SIGNIFICANT CHANGE UP
LYMPHOCYTES # BLD AUTO: 1.01 K/UL — SIGNIFICANT CHANGE UP (ref 1–3.3)
LYMPHOCYTES # BLD AUTO: 8.6 % — LOW (ref 13–44)
MCHC RBC-ENTMCNC: 29 PG — SIGNIFICANT CHANGE UP (ref 27–34)
MCHC RBC-ENTMCNC: 32.7 GM/DL — SIGNIFICANT CHANGE UP (ref 32–36)
MCV RBC AUTO: 88.7 FL — SIGNIFICANT CHANGE UP (ref 80–100)
MONOCYTES # BLD AUTO: 0.86 K/UL — SIGNIFICANT CHANGE UP (ref 0–0.9)
MONOCYTES NFR BLD AUTO: 7.3 % — SIGNIFICANT CHANGE UP (ref 2–14)
NEUTROPHILS # BLD AUTO: 9.72 K/UL — HIGH (ref 1.8–7.4)
NEUTROPHILS NFR BLD AUTO: 83 % — HIGH (ref 43–77)
NITRITE UR-MCNC: NEGATIVE — SIGNIFICANT CHANGE UP
NRBC # BLD: 0 /100 WBCS — SIGNIFICANT CHANGE UP (ref 0–0)
PH UR: 5 — SIGNIFICANT CHANGE UP (ref 5–8)
PLATELET # BLD AUTO: 249 K/UL — SIGNIFICANT CHANGE UP (ref 150–400)
POTASSIUM SERPL-MCNC: 4 MMOL/L — SIGNIFICANT CHANGE UP (ref 3.5–5.3)
POTASSIUM SERPL-SCNC: 4 MMOL/L — SIGNIFICANT CHANGE UP (ref 3.5–5.3)
PROT SERPL-MCNC: 7.8 G/DL — SIGNIFICANT CHANGE UP (ref 6–8.3)
PROT UR-MCNC: 100
RBC # BLD: 5.76 M/UL — SIGNIFICANT CHANGE UP (ref 4.2–5.8)
RBC # FLD: 13 % — SIGNIFICANT CHANGE UP (ref 10.3–14.5)
SODIUM SERPL-SCNC: 136 MMOL/L — SIGNIFICANT CHANGE UP (ref 135–145)
SP GR SPEC: 1.02 — SIGNIFICANT CHANGE UP (ref 1.01–1.02)
TRIGL SERPL-MCNC: 193 MG/DL — HIGH (ref 10–149)
UROBILINOGEN FLD QL: NEGATIVE — SIGNIFICANT CHANGE UP
WBC # BLD: 11.72 K/UL — HIGH (ref 3.8–10.5)
WBC # FLD AUTO: 11.72 K/UL — HIGH (ref 3.8–10.5)

## 2019-04-29 PROCEDURE — 74177 CT ABD & PELVIS W/CONTRAST: CPT | Mod: 26

## 2019-04-29 PROCEDURE — 99285 EMERGENCY DEPT VISIT HI MDM: CPT

## 2019-04-29 RX ORDER — ALLOPURINOL 300 MG
300 TABLET ORAL DAILY
Qty: 0 | Refills: 0 | Status: DISCONTINUED | OUTPATIENT
Start: 2019-04-29 | End: 2019-05-02

## 2019-04-29 RX ORDER — FEBUXOSTAT 40 MG/1
1 TABLET ORAL
Qty: 0 | Refills: 0 | COMMUNITY

## 2019-04-29 RX ORDER — KETOROLAC TROMETHAMINE 30 MG/ML
30 SYRINGE (ML) INJECTION ONCE
Qty: 0 | Refills: 0 | Status: DISCONTINUED | OUTPATIENT
Start: 2019-04-29 | End: 2019-04-29

## 2019-04-29 RX ORDER — SODIUM CHLORIDE 9 MG/ML
1000 INJECTION INTRAMUSCULAR; INTRAVENOUS; SUBCUTANEOUS ONCE
Qty: 0 | Refills: 0 | Status: COMPLETED | OUTPATIENT
Start: 2019-04-29 | End: 2019-04-29

## 2019-04-29 RX ORDER — SODIUM CHLORIDE 9 MG/ML
1000 INJECTION, SOLUTION INTRAVENOUS
Qty: 0 | Refills: 0 | Status: DISCONTINUED | OUTPATIENT
Start: 2019-04-29 | End: 2019-04-30

## 2019-04-29 RX ORDER — MORPHINE SULFATE 50 MG/1
1 CAPSULE, EXTENDED RELEASE ORAL EVERY 4 HOURS
Qty: 0 | Refills: 0 | Status: DISCONTINUED | OUTPATIENT
Start: 2019-04-29 | End: 2019-05-02

## 2019-04-29 RX ORDER — ALLOPURINOL 300 MG
1 TABLET ORAL
Qty: 0 | Refills: 0 | COMMUNITY

## 2019-04-29 RX ORDER — MORPHINE SULFATE 50 MG/1
2 CAPSULE, EXTENDED RELEASE ORAL ONCE
Qty: 0 | Refills: 0 | Status: DISCONTINUED | OUTPATIENT
Start: 2019-04-29 | End: 2019-04-29

## 2019-04-29 RX ORDER — FAMOTIDINE 10 MG/ML
20 INJECTION INTRAVENOUS ONCE
Qty: 0 | Refills: 0 | Status: DISCONTINUED | OUTPATIENT
Start: 2019-04-29 | End: 2019-04-29

## 2019-04-29 RX ORDER — ENOXAPARIN SODIUM 100 MG/ML
40 INJECTION SUBCUTANEOUS DAILY
Qty: 0 | Refills: 0 | Status: DISCONTINUED | OUTPATIENT
Start: 2019-04-29 | End: 2019-05-02

## 2019-04-29 RX ORDER — ONDANSETRON 8 MG/1
4 TABLET, FILM COATED ORAL ONCE
Qty: 0 | Refills: 0 | Status: COMPLETED | OUTPATIENT
Start: 2019-04-29 | End: 2019-04-29

## 2019-04-29 RX ORDER — FAMOTIDINE 10 MG/ML
20 INJECTION INTRAVENOUS ONCE
Qty: 0 | Refills: 0 | Status: COMPLETED | OUTPATIENT
Start: 2019-04-29 | End: 2019-04-29

## 2019-04-29 RX ADMIN — SODIUM CHLORIDE 1000 MILLILITER(S): 9 INJECTION INTRAMUSCULAR; INTRAVENOUS; SUBCUTANEOUS at 15:26

## 2019-04-29 RX ADMIN — SODIUM CHLORIDE 200 MILLILITER(S): 9 INJECTION, SOLUTION INTRAVENOUS at 16:06

## 2019-04-29 RX ADMIN — MORPHINE SULFATE 1 MILLIGRAM(S): 50 CAPSULE, EXTENDED RELEASE ORAL at 22:58

## 2019-04-29 RX ADMIN — Medication 30 MILLIGRAM(S): at 13:20

## 2019-04-29 RX ADMIN — SODIUM CHLORIDE 1000 MILLILITER(S): 9 INJECTION INTRAMUSCULAR; INTRAVENOUS; SUBCUTANEOUS at 15:51

## 2019-04-29 RX ADMIN — SODIUM CHLORIDE 1000 MILLILITER(S): 9 INJECTION INTRAMUSCULAR; INTRAVENOUS; SUBCUTANEOUS at 13:20

## 2019-04-29 RX ADMIN — ONDANSETRON 4 MILLIGRAM(S): 8 TABLET, FILM COATED ORAL at 13:19

## 2019-04-29 RX ADMIN — MORPHINE SULFATE 2 MILLIGRAM(S): 50 CAPSULE, EXTENDED RELEASE ORAL at 16:29

## 2019-04-29 RX ADMIN — MORPHINE SULFATE 2 MILLIGRAM(S): 50 CAPSULE, EXTENDED RELEASE ORAL at 17:11

## 2019-04-29 RX ADMIN — MORPHINE SULFATE 1 MILLIGRAM(S): 50 CAPSULE, EXTENDED RELEASE ORAL at 21:15

## 2019-04-29 RX ADMIN — MORPHINE SULFATE 1 MILLIGRAM(S): 50 CAPSULE, EXTENDED RELEASE ORAL at 18:49

## 2019-04-29 RX ADMIN — Medication 30 MILLIGRAM(S): at 15:51

## 2019-04-29 RX ADMIN — MORPHINE SULFATE 1 MILLIGRAM(S): 50 CAPSULE, EXTENDED RELEASE ORAL at 17:47

## 2019-04-29 NOTE — ED PROVIDER NOTE - CLINICAL SUMMARY MEDICAL DECISION MAKING FREE TEXT BOX
Patient presenting with LLQ abdominal pain. Will obtain abdominal labs, CT r/o diverticulitis vs Nephrolithiasis.

## 2019-04-29 NOTE — ED ADULT NURSE NOTE - OBJECTIVE STATEMENT
AOX4 +ambulatory patient reports RUQ and RLQ pain with nausea x 1 day. Patient denies any vomiting diarrhea fevers or chills

## 2019-04-29 NOTE — PATIENT PROFILE ADULT - FALLEN IN THE PAST
1. We reviewed healthy diet and carbohydrate counting today  2. Spread your carbohydrate foods throughout the day to help prevent symptoms of low blood sugar  3. 45-60 grams of carbohydrate for all main meals and 15-30 grams carb for snacks inbetween meals  4. Stop drinking all sugar sweetened beverages  5. Follow up as needed  Jodi Pratt RD, LD, CDE  [facility]  [address]  [address]  [place]  [address]  Phone: [phone]  Appointment line: [phone]  Email: [email or web address]      
no

## 2019-04-29 NOTE — ED PROVIDER NOTE - OBJECTIVE STATEMENT
47 yo M with history of gout presenting with one day history of L sided abdominal pain. Pain is LLQ and LUQ. Patient reports associated nausea without vomiting. Patient denies Testicle pain or penile pain. Patient did not take medications prior to arrival. no surgical history. Denies chest pain, SOB. Describes pain as a tightening sensation. States the pain was most intense last night and improved this morning and now steadily worsening.

## 2019-04-29 NOTE — ED PROVIDER NOTE - PROGRESS NOTE DETAILS
Patient's lipase elevated awaiting CT results CT demonstrates pancreatitic tail inflammation. Will admit for pancreatitis. Sign out complete with Dr. Brady and Dr. vides.

## 2019-04-29 NOTE — H&P ADULT - PROBLEM SELECTOR PLAN 3
IMPROVE VTE score: 3  Will manage with: Lovenox S/C   [ ] Previous VTE                                    3  [ ] Thrombophilia                                  2  [ x] Lower limb paralysis                        2  (unable to hold up >15 seconds)    [ ] Current Cancer (within 6 months)        2   [x] Immobilization > 24 hrs                    1  [ ] ICU/CCU stay > 24 hrs                      1  [] Age > 60                                         1

## 2019-04-29 NOTE — CHART NOTE - NSCHARTNOTEFT_GEN_A_CORE
EVENT: Epigastric pain. Patient reporting epigastric pain 6/10. Currently not due for PRN morphine. Famotidine 20mg IVP ordered.     OBJECTIVE:  Vital Signs Last 24 Hrs  T(C): 36.8 (29 Apr 2019 23:41), Max: 36.8 (29 Apr 2019 23:41)  T(F): 98.2 (29 Apr 2019 23:41), Max: 98.2 (29 Apr 2019 23:41)  HR: 84 (29 Apr 2019 23:41) (74 - 92)  BP: 136/73 (29 Apr 2019 23:41) (114/74 - 153/84)  BP(mean): --  RR: 17 (29 Apr 2019 23:41) (17 - 18)  SpO2: 99% (29 Apr 2019 23:41) (99% - 100%)      LABS:                        16.7   11.72 )-----------( 249      ( 29 Apr 2019 13:08 )             51.1     04-29    136  |  101  |  13  ----------------------------<  126<H>  4.0   |  30  |  1.02    Ca    8.8      29 Apr 2019 13:08    TPro  7.8  /  Alb  3.9  /  TBili  0.5  /  DBili  x   /  AST  12  /  ALT  28  /  AlkPhos  70  04-29        ASSESSMENT:  HPI:  47 y/o Male from home with history of gout presenting with one day history of Left sided abdominal pain. Pain is LLQ and LUQ. Patient reports associated nausea without vomiting. Patient denies Testicle pain or penile pain. Patient did not take medications prior to arrival. No surgical history. Denies chest pain, SOB. Describes pain as a tightening sensation. States the pain was most intense last night and improved this morning and now steadily worsening. (29 Apr 2019 17:34)      PLAN:   -Famotidine 20mg IVP single dose  -Pain reassessment     FOLLOW UP / RESULT:

## 2019-04-29 NOTE — H&P ADULT - PROBLEM SELECTOR PLAN 1
Will manage with IV fluids: Lactate ringer 200ml/hr  Will Maintain NPO for now and will add diet after resolution of symptoms  Pain management with Morphine  Pepcid 20 mg IV q 12hrs  Gastroenterology Consult Dr Roa

## 2019-04-29 NOTE — H&P ADULT - HISTORY OF PRESENT ILLNESS
47 y/o Male from home with history of gout presenting with one day history of Left sided abdominal pain. Pain is LLQ and LUQ. Patient reports associated nausea without vomiting. Patient denies Testicle pain or penile pain. Patient did not take medications prior to arrival. No surgical history. Denies chest pain, SOB. Describes pain as a tightening sensation. States the pain was most intense last night and improved this morning and now steadily worsening.

## 2019-04-29 NOTE — H&P ADULT - NSHPPHYSICALEXAM_GEN_ALL_CORE
General - NAD, sitting up in bed, well groomed  ENT - Nonicteric sclerae, PERRLA, EOMI. Oropharynx clear. Moist mucous membranes.   Neck - No noticeable or palpable swelling, redness or rash around throat or on face  Cardiovascular - RRR, no JVD  Lungs - Clear to ascultation, no use of accessory muscles, no crackles or wheezes.  Abdomen - Normal bowel sounds, abdomen soft. LLQ tenderness no guarding  Extremities - No edema, cyanosis or clubbing  Musculo Skeletal - 5/5 strength, normal range of motion, no swollen or erythematous  joints.  Neurological – Alert and oriented x 3

## 2019-04-30 DIAGNOSIS — N42.89 OTHER SPECIFIED DISORDERS OF PROSTATE: ICD-10-CM

## 2019-04-30 LAB
ANION GAP SERPL CALC-SCNC: 5 MMOL/L — SIGNIFICANT CHANGE UP (ref 5–17)
BASOPHILS # BLD AUTO: 0.03 K/UL — SIGNIFICANT CHANGE UP (ref 0–0.2)
BASOPHILS NFR BLD AUTO: 0.3 % — SIGNIFICANT CHANGE UP (ref 0–2)
BUN SERPL-MCNC: 8 MG/DL — SIGNIFICANT CHANGE UP (ref 7–18)
CALCIUM SERPL-MCNC: 8.5 MG/DL — SIGNIFICANT CHANGE UP (ref 8.4–10.5)
CHLORIDE SERPL-SCNC: 100 MMOL/L — SIGNIFICANT CHANGE UP (ref 96–108)
CHOLEST SERPL-MCNC: 219 MG/DL — HIGH (ref 10–199)
CO2 SERPL-SCNC: 29 MMOL/L — SIGNIFICANT CHANGE UP (ref 22–31)
CREAT SERPL-MCNC: 0.91 MG/DL — SIGNIFICANT CHANGE UP (ref 0.5–1.3)
EOSINOPHIL # BLD AUTO: 0.03 K/UL — SIGNIFICANT CHANGE UP (ref 0–0.5)
EOSINOPHIL NFR BLD AUTO: 0.3 % — SIGNIFICANT CHANGE UP (ref 0–6)
FOLATE SERPL-MCNC: 9 NG/ML — SIGNIFICANT CHANGE UP
GLUCOSE BLDC GLUCOMTR-MCNC: 110 MG/DL — HIGH (ref 70–99)
GLUCOSE SERPL-MCNC: 110 MG/DL — HIGH (ref 70–99)
HBA1C BLD-MCNC: 5.7 % — HIGH (ref 4–5.6)
HCT VFR BLD CALC: 47.6 % — SIGNIFICANT CHANGE UP (ref 39–50)
HDLC SERPL-MCNC: 38 MG/DL — LOW
HGB BLD-MCNC: 15.7 G/DL — SIGNIFICANT CHANGE UP (ref 13–17)
IMM GRANULOCYTES NFR BLD AUTO: 0.4 % — SIGNIFICANT CHANGE UP (ref 0–1.5)
LIDOCAIN IGE QN: 551 U/L — HIGH (ref 73–393)
LIPID PNL WITH DIRECT LDL SERPL: 155 MG/DL — SIGNIFICANT CHANGE UP
LYMPHOCYTES # BLD AUTO: 0.87 K/UL — LOW (ref 1–3.3)
LYMPHOCYTES # BLD AUTO: 7.5 % — LOW (ref 13–44)
MAGNESIUM SERPL-MCNC: 1.9 MG/DL — SIGNIFICANT CHANGE UP (ref 1.6–2.6)
MCHC RBC-ENTMCNC: 29.2 PG — SIGNIFICANT CHANGE UP (ref 27–34)
MCHC RBC-ENTMCNC: 33 GM/DL — SIGNIFICANT CHANGE UP (ref 32–36)
MCV RBC AUTO: 88.5 FL — SIGNIFICANT CHANGE UP (ref 80–100)
MONOCYTES # BLD AUTO: 1.51 K/UL — HIGH (ref 0–0.9)
MONOCYTES NFR BLD AUTO: 13 % — SIGNIFICANT CHANGE UP (ref 2–14)
NEUTROPHILS # BLD AUTO: 9.1 K/UL — HIGH (ref 1.8–7.4)
NEUTROPHILS NFR BLD AUTO: 78.5 % — HIGH (ref 43–77)
NRBC # BLD: 0 /100 WBCS — SIGNIFICANT CHANGE UP (ref 0–0)
PHOSPHATE SERPL-MCNC: 2.9 MG/DL — SIGNIFICANT CHANGE UP (ref 2.5–4.5)
PLATELET # BLD AUTO: 230 K/UL — SIGNIFICANT CHANGE UP (ref 150–400)
POTASSIUM SERPL-MCNC: 3.8 MMOL/L — SIGNIFICANT CHANGE UP (ref 3.5–5.3)
POTASSIUM SERPL-SCNC: 3.8 MMOL/L — SIGNIFICANT CHANGE UP (ref 3.5–5.3)
RBC # BLD: 5.38 M/UL — SIGNIFICANT CHANGE UP (ref 4.2–5.8)
RBC # FLD: 13.2 % — SIGNIFICANT CHANGE UP (ref 10.3–14.5)
SODIUM SERPL-SCNC: 134 MMOL/L — LOW (ref 135–145)
TOTAL CHOLESTEROL/HDL RATIO MEASUREMENT: 5.8 RATIO — SIGNIFICANT CHANGE UP (ref 3.4–9.6)
TRIGL SERPL-MCNC: 130 MG/DL — SIGNIFICANT CHANGE UP (ref 10–149)
TSH SERPL-MCNC: 0.71 UU/ML — SIGNIFICANT CHANGE UP (ref 0.34–4.82)
VIT B12 SERPL-MCNC: 208 PG/ML — LOW (ref 232–1245)
WBC # BLD: 11.59 K/UL — HIGH (ref 3.8–10.5)
WBC # FLD AUTO: 11.59 K/UL — HIGH (ref 3.8–10.5)

## 2019-04-30 RX ORDER — SODIUM CHLORIDE 9 MG/ML
1000 INJECTION, SOLUTION INTRAVENOUS
Qty: 0 | Refills: 0 | Status: DISCONTINUED | OUTPATIENT
Start: 2019-04-30 | End: 2019-05-01

## 2019-04-30 RX ORDER — IBUPROFEN 200 MG
400 TABLET ORAL ONCE
Qty: 0 | Refills: 0 | Status: COMPLETED | OUTPATIENT
Start: 2019-04-30 | End: 2019-04-30

## 2019-04-30 RX ORDER — INFLUENZA VIRUS VACCINE 15; 15; 15; 15 UG/.5ML; UG/.5ML; UG/.5ML; UG/.5ML
0.5 SUSPENSION INTRAMUSCULAR ONCE
Qty: 0 | Refills: 0 | Status: DISCONTINUED | OUTPATIENT
Start: 2019-04-30 | End: 2019-05-02

## 2019-04-30 RX ORDER — FAMOTIDINE 10 MG/ML
20 INJECTION INTRAVENOUS ONCE
Qty: 0 | Refills: 0 | Status: COMPLETED | OUTPATIENT
Start: 2019-04-30 | End: 2019-04-30

## 2019-04-30 RX ADMIN — SODIUM CHLORIDE 200 MILLILITER(S): 9 INJECTION, SOLUTION INTRAVENOUS at 06:10

## 2019-04-30 RX ADMIN — SODIUM CHLORIDE 200 MILLILITER(S): 9 INJECTION, SOLUTION INTRAVENOUS at 08:46

## 2019-04-30 RX ADMIN — MORPHINE SULFATE 1 MILLIGRAM(S): 50 CAPSULE, EXTENDED RELEASE ORAL at 19:00

## 2019-04-30 RX ADMIN — Medication 400 MILLIGRAM(S): at 04:00

## 2019-04-30 RX ADMIN — ENOXAPARIN SODIUM 40 MILLIGRAM(S): 100 INJECTION SUBCUTANEOUS at 12:35

## 2019-04-30 RX ADMIN — MORPHINE SULFATE 1 MILLIGRAM(S): 50 CAPSULE, EXTENDED RELEASE ORAL at 02:06

## 2019-04-30 RX ADMIN — MORPHINE SULFATE 1 MILLIGRAM(S): 50 CAPSULE, EXTENDED RELEASE ORAL at 01:59

## 2019-04-30 RX ADMIN — MORPHINE SULFATE 1 MILLIGRAM(S): 50 CAPSULE, EXTENDED RELEASE ORAL at 19:14

## 2019-04-30 RX ADMIN — SODIUM CHLORIDE 200 MILLILITER(S): 9 INJECTION, SOLUTION INTRAVENOUS at 17:49

## 2019-04-30 RX ADMIN — MORPHINE SULFATE 1 MILLIGRAM(S): 50 CAPSULE, EXTENDED RELEASE ORAL at 15:25

## 2019-04-30 RX ADMIN — Medication 400 MILLIGRAM(S): at 03:11

## 2019-04-30 RX ADMIN — FAMOTIDINE 20 MILLIGRAM(S): 10 INJECTION INTRAVENOUS at 02:00

## 2019-04-30 RX ADMIN — MORPHINE SULFATE 1 MILLIGRAM(S): 50 CAPSULE, EXTENDED RELEASE ORAL at 23:09

## 2019-04-30 RX ADMIN — MORPHINE SULFATE 1 MILLIGRAM(S): 50 CAPSULE, EXTENDED RELEASE ORAL at 23:20

## 2019-04-30 RX ADMIN — Medication 300 MILLIGRAM(S): at 12:35

## 2019-04-30 RX ADMIN — MORPHINE SULFATE 1 MILLIGRAM(S): 50 CAPSULE, EXTENDED RELEASE ORAL at 15:10

## 2019-04-30 NOTE — PROGRESS NOTE ADULT - SUBJECTIVE AND OBJECTIVE BOX
NP Note discussed with  Primary Attending    Patient is a 48y old  Male who presents with a chief complaint of Abdominal Pain (2019 08:45)      INTERVAL HPI/OVERNIGHT EVENTS: no new complaints    MEDICATIONS  (STANDING):  allopurinol 300 milliGRAM(s) Oral daily  enoxaparin Injectable 40 milliGRAM(s) SubCutaneous daily  influenza   Vaccine 0.5 milliLiter(s) IntraMuscular once  lactated ringers. 1000 milliLiter(s) (200 mL/Hr) IV Continuous <Continuous>    MEDICATIONS  (PRN):  morphine  - Injectable 1 milliGRAM(s) IV Push every 4 hours PRN Severe Pain (7 - 10)      __________________________________________________  REVIEW OF SYSTEMS:    CONSTITUTIONAL: No fever,   EYES: no acute visual disturbances  NECK: No pain or stiffness  RESPIRATORY: No cough; No shortness of breath  CARDIOVASCULAR: No chest pain, no palpitations  GASTROINTESTINAL: No pain. No nausea or vomiting; No diarrhea   NEUROLOGICAL: No headache or numbness, no tremors  MUSCULOSKELETAL: No joint pain, no muscle pain  GENITOURINARY: no dysuria, no frequency, no hesitancy  PSYCHIATRY: no depression , no anxiety  ALL OTHER  ROS negative        Vital Signs Last 24 Hrs  T(C): 36.8 (2019 05:10), Max: 36.8 (2019 23:41)  T(F): 98.2 (2019 05:10), Max: 98.2 (2019 23:41)  HR: 87 (2019 05:10) (74 - 87)  BP: 129/74 (2019 05:10) (114/74 - 136/73)  BP(mean): --  RR: 17 (2019 05:10) (17 - 17)  SpO2: 98% (2019 05:10) (98% - 100%)    ________________________________________________  PHYSICAL EXAM:  GENERAL: NAD  HEENT: Normocephalic;  conjunctivae and sclerae clear; moist mucous membranes;   NECK : supple  CHEST/LUNG: Clear to auscultation bilaterally with good air entry   HEART: S1 S2  regular; no murmurs, gallops or rubs  ABDOMEN: Soft, Nontender, Nondistended; Bowel sounds present  EXTREMITIES: no cyanosis; no edema; no calf tenderness  SKIN: warm and dry; no rash  NERVOUS SYSTEM:  Awake and alert; Oriented  to place, person and time ; no new deficits    _________________________________________________  LABS:                        15.7   11.59 )-----------( 230      ( 2019 06:55 )             47.6         134<L>  |  100  |  8   ----------------------------<  110<H>  3.8   |  29  |  0.91    Ca    8.5      2019 06:55  Phos  2.9       Mg     1.9         TPro  7.8  /  Alb  3.9  /  TBili  0.5  /  DBili  x   /  AST  12  /  ALT  28  /  AlkPhos  70        Urinalysis Basic - ( 2019 12:53 )    Color: Yellow / Appearance: Clear / S.020 / pH: x  Gluc: x / Ketone: Negative  / Bili: Negative / Urobili: Negative   Blood: x / Protein: 100 / Nitrite: Negative   Leuk Esterase: Negative / RBC: 2-5 /HPF / WBC 0-2 /HPF   Sq Epi: x / Non Sq Epi: Occasional /HPF / Bacteria: Few /HPF      CAPILLARY BLOOD GLUCOSE      POCT Blood Glucose.: 110 mg/dL (2019 07:50)    RADIOLOGY & ADDITIONAL TESTS:      < from: CT Abdomen and Pelvis w/ IV Cont (19 @ 14:21) >  IMPRESSION:    Minimal pancreatitis about the pancreatic tail. Mild prominence of the   pancreatic tail unchangedfrom examinations dating back to  as above.    Enlarged asymmetrical prostate. Recommend urology evaluation.    Hepatomegaly and hepatic steatosis.    Mild splenomegaly.    < end of copied text >    Imaging  Reviewed:  YES    Consultant(s) Notes Reviewed:   YES      Plan of care was discussed with patient and /or primary care giver; all questions and concerns were addressed

## 2019-04-30 NOTE — CONSULT NOTE ADULT - NEGATIVE ENMT SYMPTOMS
no gum bleeding/no throat pain/no dysphagia/no hearing difficulty/no dry mouth/no nose bleeds/no ear pain

## 2019-04-30 NOTE — CONSULT NOTE ADULT - SUBJECTIVE AND OBJECTIVE BOX
49 yo male, pmhx DM, gout, admitted with LLQ andLUQ abd pain, dx with pancreatitis, lipase 500+ No hx ETOH abuse, no new meds, never before similar dx.  Pt underwent Ct for pancreatitis and Prostate was noted to be irregular with left side base protruding to left of midline.    Pt denies dysuria or hematuria or flank pain.  Pt admits nocturia 1-2x for years. he states he has seen his urologist Dr Kimbrough 6 mo ago and told microhematuria for which workup was nagative, and that he had an enlarged prostate and was told not worrisome.  he is scheduled to see Dr Kimbrough on 5-13-19.  Pt deneis FHX CaProstate.  He states he does not take any prostate meds.    Alert NAD  WDWN male.  Upon explanation of planned physical axam which would necessarily include LORENZO, pt expressed reluctance saying that he would rather not undergo LORENZO and would rather be examined by his own Urologist in 2 weeks as OP.                          15.7   11.59 )-----------( 230      ( 30 Apr 2019 06:55 )             47.6     04-30    134<L>  |  100  |  8   ----------------------------<  110<H>  3.8   |  29  |  0.91    Ca    8.5      30 Apr 2019 06:55  Phos  2.9     04-30  Mg     1.9     04-30    TPro  7.8  /  Alb  3.9  /  TBili  0.5  /  DBili  x   /  AST  12  /  ALT  28  /  AlkPhos  70  04-29    < from: CT Abdomen and Pelvis w/ IV Cont (04.29.19 @ 14:21) >  PANCREAS: Mild involutional changes of the pancreatic head, neck and body   are reidentified with less fatty and interdigitation of the pancreatic   tail. The appearance is unchanged compared with examinations dating back  to 2015. There is minimal fat stranding of the pancreatic tail.  ADRENALS: Within normal limits.  KIDNEYS/URETERS: 7.7 x 6.8 cm right mid to lower pole renal cyst. 1 cm   left lower pole cyst. No hydronephrosis, solid masses or calculi. The   kidneys enhance symmetrically. There is no perinephric edema. The ureters   are not distended.    BLADDER: Within normal limits.  REPRODUCTIVE ORGANS: Asymmetrically enlarged lobulated prostate measuring   6.2 x 4.5 cm. There is mild anterior bulging of the prostate base to the   left of midline.    < end of copied text >
[  ] STAT REQUEST              [  ]ROUTINE REQUEST    Patient is a 48y old  Male with abdominal pain. GI consulted to evaluate.         HPI:  48 year old male with past medical history significant for pancreatitis presented with one day h/o abdominal pain associated with nausea but no vomiting.  Patient denies ETOH intake, hematemesis, hematochezia, melena, fever, chills, chest pain, SOB, cough, hematuria, dysuria or diarrhea.       PAIN MANAGEMENT:  Pain Scale:                6-7 /10  Pain Location:  LUQ abdominal pain    Prior Colonoscopy:  Unknown    PAST MEDICAL HISTORY  Pancreatitis  Renal stones  Gout  Diabetes      PAST SURGICAL HISTORY  No significant past surgical history      Allergies    No Known Drug Allergies  Seafood (Anaphylaxis)          MEDICATIONS  (STANDING):  allopurinol 300 milliGRAM(s) Oral daily  enoxaparin Injectable 40 milliGRAM(s) SubCutaneous daily  influenza   Vaccine 0.5 milliLiter(s) IntraMuscular once  lactated ringers. 1000 milliLiter(s) (200 mL/Hr) IV Continuous <Continuous>    MEDICATIONS  (PRN):  morphine  - Injectable 1 milliGRAM(s) IV Push every 4 hours PRN Severe Pain (7 - 10)      SOCIAL HISTORY  Advanced Directives:       [ X ] Full Code       [  ] DNR  Marital Status:         [  ] M      [ X ] S      [  ] D       [  ] W  Children:       [X ] Yes      [  ] No  Occupation:        [X  ] Employed       [  ] Unemployed       [  ] Retired  Diet:       [ X ] Regular       [  ] PEG feeding          [  ] NG tube feeding  Drug Use:           [ X ] Patient denied          [  ] Yes  Alcohol:           [  ] No             [ X ] Yes (socially)         [  ] Yes (chronic)  Tobacco:           [  ] Yes           [ X ] No    FAMILY HISTORY  [ X ] Heart Disease            [  ] Diabetes             [  ] HTN             [  ] Colon Cancer             [  ] Stomach Cancer              [  ] Pancreatic Cancer        VITAL SIGNS   Vital Signs Last 24 Hrs  T(C): 37.1 (2019 14:02), Max: 37.1 (2019 14:02)  T(F): 98.8 (2019 14:02), Max: 98.8 (2019 14:02)  HR: 86 (2019 14:02) (84 - 87)  BP: 128/76 (2019 14:02) (128/76 - 136/73)   RR: 17 (2019 14:02) (17 - 17)  SpO2: 99% (2019 14:02) (98% - 99%)    Daily Weight in k.1 (2019 23:41)           CBC Full  -  ( 2019 06:55 )  WBC Count : 11.59 K/uL  RBC Count : 5.38 M/uL  Hemoglobin : 15.7 g/dL  Hematocrit : 47.6 %  Platelet Count - Automated : 230 K/uL  Mean Cell Volume : 88.5 fl  Mean Cell Hemoglobin : 29.2 pg  Mean Cell Hemoglobin Concentration : 33.0 gm/dL  Auto Neutrophil # : 9.10 K/uL  Auto Lymphocyte # : 0.87 K/uL  Auto Monocyte # : 1.51 K/uL  Auto Eosinophil # : 0.03 K/uL  Auto Basophil # : 0.03 K/uL  Auto Neutrophil % : 78.5 %  Auto Lymphocyte % : 7.5 %  Auto Monocyte % : 13.0 %  Auto Eosinophil % : 0.3 %  Auto Basophil % : 0.3 %          134<L>  |  100  |  8   ----------------------------<  110<H>  3.8   |  29  |  0.91    Ca    8.5      2019 06:55  Phos  2.9       Mg     1.9         TPro  7.8  /  Alb  3.9  /  TBili  0.5  /  DBili  x   /  AST  12  /  ALT  28  /  AlkPhos  70    Lipase, Serum (19 @ 13:08)    Lipase, Serum: 788 U/L    Lipase, Serum: 551 U/L ( @ 06:55)     Urinalysis (19 @ 12:53)    Glucose Qualitative, Urine: Negative    Blood, Urine: Moderate    pH Urine: 5.0    Color: Yellow    Urine Appearance: Clear    Bilirubin: Negative    Ketone - Urine: Negative    Specific Gravity: 1.020    Protein, Urine: 100    Urobilinogen: Negative    Nitrite: Negative    Leukocyte Esterase Concentration: Negative      ECG  Ventricular Rate 131 BPM    Atrial Rate 131 BPM    P-R Interval 144 ms    QRS Duration 84 ms     ms    QTc 413 ms    P Axis 43 degrees    R Axis 42 degrees    T Axis -65 degrees    Diagnosis Line Sinus tachycardia  T wave abnormality, consider inferior ischemia  Abnormal ECG    RADIOLOGY/IMAGING    EXAM:  CT ABDOMEN AND PELVIS IC                            PROCEDURE DATE:  2019          INTERPRETATION:  CLINICAL HISTORY: 48 years  Male with LLQ abdominal pain.    COMPARISON: Contrast-enhanced CT dated 2017 and noncontrast   examinations dating back to 2015    PROCEDURE:   CT of the Abdomen and Pelvis was performed with intravenous contrast.   Intravenous contrast: 90 ml Omnipaque 350. 10 ml discarded.  Oral contrast: None.  Sagittal and coronal reformats were performed.    LIMITATIONS: Evaluation of the bowel is limited without enteric contrast    FINDINGS:    LOWER CHEST: Mild bibasilar dependent changes.    LIVER: 2 less than 5 mm hypodensities in the right hepatic lobe are   unchanged compared with 2017. There is no new mass or intrahepatic   biliary duct distention. The right lobe measures 22.2 cm sagittal. Low   attenuation parenchyma secondary to hepatic steatosis.  BILE DUCTS: Normal caliber.  GALLBLADDER: Within normal limits.  SPLEEN: Mild splenomegaly. 13.5cm sagittal. No focal mass.  PANCREAS: Mild involutional changes of the pancreatic head, neck and body   are reidentified with less fatty and interdigitation of the pancreatic   tail. The appearance is unchanged compared with examinations dating back  to . There is minimal fat stranding of the pancreatic tail.  ADRENALS: Within normal limits.  KIDNEYS/URETERS: 7.7 x 6.8 cm right mid to lower pole renal cyst. 1 cm   left lower pole cyst. No hydronephrosis, solid masses or calculi. The   kidneys enhance symmetrically. There is no perinephric edema. The ureters   are not distended.    BLADDER: Within normal limits.  REPRODUCTIVE ORGANS: Asymmetrically enlarged lobulated prostate measuring   6.2 x 4.5 cm. There is mild anterior bulging of the prostate base to the   left of midline.    BOWEL: No bowel obstruction. The appendix is normal.The stomach is   decompressed and cannot be assessed. Mild retained fecal matter   throughout the colon.  PERITONEUM: No ascites.  VESSELS:  Within normallimits.  RETROPERITONEUM: No lymphadenopathy.    ABDOMINAL WALL: Trace fat-containing umbilical hernia.  BONES: Within normal limits.    IMPRESSION:    Minimal pancreatitis about the pancreatic tail. Mild prominence of the   pancreatic tail unchangedfrom examinations dating back to  as above.    Enlarged asymmetrical prostate. Recommend urology evaluation.    Hepatomegaly and hepatic steatosis.    Mild splenomegaly.

## 2019-04-30 NOTE — CHART NOTE - NSCHARTNOTEFT_GEN_A_CORE
EVENT:  Abdominal pain: Patient reporting breakthrough abdominal/epigastric pain. Currently not due for PRN morphine. Patient had similar episode last night with pain relieved with IV famotidine. Famotidine 20mg IV ordered.    OBJECTIVE:  Vital Signs Last 24 Hrs  T(C): 36.7 (30 Apr 2019 21:07), Max: 37.1 (30 Apr 2019 14:02)  T(F): 98 (30 Apr 2019 21:07), Max: 98.8 (30 Apr 2019 14:02)  HR: 90 (30 Apr 2019 21:07) (84 - 90)  BP: 136/79 (30 Apr 2019 21:07) (128/76 - 136/79)  BP(mean): --  RR: 16 (30 Apr 2019 21:07) (16 - 17)  SpO2: 97% (30 Apr 2019 21:07) (97% - 99%)    FOCUSED PHYSICAL EXAM:    LABS:                        15.7   11.59 )-----------( 230      ( 30 Apr 2019 06:55 )             47.6     04-30    134<L>  |  100  |  8   ----------------------------<  110<H>  3.8   |  29  |  0.91    Ca    8.5      30 Apr 2019 06:55  Phos  2.9     04-30  Mg     1.9     04-30    TPro  7.8  /  Alb  3.9  /  TBili  0.5  /  DBili  x   /  AST  12  /  ALT  28  /  AlkPhos  70  04-29      ASSESSMENT:  HPI:  47 y/o Male from home with history of gout presenting with one day history of Left sided abdominal pain. Pain is LLQ and LUQ. Patient reports associated nausea without vomiting. Patient denies Testicle pain or penile pain. Patient did not take medications prior to arrival. No surgical history. Denies chest pain, SOB. Describes pain as a tightening sensation. States the pain was most intense last night and improved this morning and now steadily worsening. (29 Apr 2019 17:34)      PLAN:   -Famotidine 20mg IVP single dose    FOLLOW UP / RESULT:

## 2019-04-30 NOTE — CONSULT NOTE ADULT - NEGATIVE NEUROLOGICAL SYMPTOMS
no syncope/no vertigo/no tremors/no headache/no confusion/no loss of sensation/no difficulty walking

## 2019-04-30 NOTE — CHART NOTE - NSCHARTNOTEFT_GEN_A_CORE
EVENT: Headache: Patient reporting mild headache and requesting Motrin. 400mg Ibuprofen ordered. Will reassess pain s/p administration    OBJECTIVE:  Vital Signs Last 24 Hrs  T(C): 36.8 (29 Apr 2019 23:41), Max: 36.8 (29 Apr 2019 23:41)  T(F): 98.2 (29 Apr 2019 23:41), Max: 98.2 (29 Apr 2019 23:41)  HR: 84 (29 Apr 2019 23:41) (74 - 92)  BP: 136/73 (29 Apr 2019 23:41) (114/74 - 153/84)  BP(mean): --  RR: 17 (29 Apr 2019 23:41) (17 - 18)  SpO2: 99% (29 Apr 2019 23:41) (99% - 100%)        LABS:                        16.7   11.72 )-----------( 249      ( 29 Apr 2019 13:08 )             51.1     04-29    136  |  101  |  13  ----------------------------<  126<H>  4.0   |  30  |  1.02    Ca    8.8      29 Apr 2019 13:08    TPro  7.8  /  Alb  3.9  /  TBili  0.5  /  DBili  x   /  AST  12  /  ALT  28  /  AlkPhos  70  04-29      EKG:   IMGAGING:    ASSESSMENT:  HPI:  47 y/o Male from home with history of gout presenting with one day history of Left sided abdominal pain. Pain is LLQ and LUQ. Patient reports associated nausea without vomiting. Patient denies Testicle pain or penile pain. Patient did not take medications prior to arrival. No surgical history. Denies chest pain, SOB. Describes pain as a tightening sensation. States the pain was most intense last night and improved this morning and now steadily worsening. (29 Apr 2019 17:34)      PLAN:   -Motrin 400mg ordered      FOLLOW UP / RESULT:

## 2019-04-30 NOTE — PROGRESS NOTE ADULT - SUBJECTIVE AND OBJECTIVE BOX
47 y/o Male from home with history of gout presenting with one day history of Left sided abdominal pain. Pain is LLQ and LUQ. Patient reports associated nausea without vomiting. Patient denies Testicle pain or penile pain. Patient did not take medications prior to arrival. No surgical history. Denies chest pain, SOB. Describes pain as a tightening sensation. States the pain was most intense last night and improved this morning and now steadily worsening.    Review of Systems:  Other Review of Systems: All other review of systems negative, except as noted in HPI	      pt seen in icu [  ], reg med floor [ x  ], bed [ x ], chair at bedside [   ], a+o x3 [x ], lethargic [  ],  nad [ x ]        Allergies    No Known Drug Allergies  Seafood (Anaphylaxis)        Vitals    T(F): 98.2 (04-30-19 @ 05:10), Max: 98.2 (04-29-19 @ 23:41)  HR: 87 (04-30-19 @ 05:10) (74 - 92)  BP: 129/74 (04-30-19 @ 05:10) (114/74 - 153/84)  RR: 17 (04-30-19 @ 05:10) (17 - 18)  SpO2: 98% (04-30-19 @ 05:10) (98% - 100%)  Wt(kg): --  CAPILLARY BLOOD GLUCOSE      POCT Blood Glucose.: 110 mg/dL (30 Apr 2019 07:50)      Labs                          15.7   11.59 )-----------( 230      ( 30 Apr 2019 06:55 )             47.6       04-30    134<L>  |  100  |  8   ----------------------------<  110<H>  3.8   |  29  |  0.91    Ca    8.5      30 Apr 2019 06:55  Phos  2.9     04-30  Mg     1.9     04-30    TPro  7.8  /  Alb  3.9  /  TBili  0.5  /  DBili  x   /  AST  12  /  ALT  28  /  AlkPhos  70  04-29                Radiology Results      Meds    MEDICATIONS  (STANDING):  allopurinol 300 milliGRAM(s) Oral daily  enoxaparin Injectable 40 milliGRAM(s) SubCutaneous daily  influenza   Vaccine 0.5 milliLiter(s) IntraMuscular once  lactated ringers. 1000 milliLiter(s) (200 mL/Hr) IV Continuous <Continuous>      MEDICATIONS  (PRN):  morphine  - Injectable 1 milliGRAM(s) IV Push every 4 hours PRN Severe Pain (7 - 10)      Physical Exam    Neuro :  no focal deficits  Respiratory: CTA B/L  CV: RRR, S1S2, no murmurs,   Abdominal: Soft, NT, ND +BS,  Extremities: No edema, + peripheral pulses    ASSESSMENT    Acute pancreatitis without infection or necrosis  h/o Renal stones  Gout  Diabetes        PLAN    npo  ivf  gi cons 49 y/o Male from home with history of gout presenting with one day history of Left sided abdominal pain. Pain is LLQ and LUQ. Patient reports associated nausea without vomiting. Patient denies Testicle pain or penile pain. Patient did not take medications prior to arrival. No surgical history. Denies chest pain, SOB. Describes pain as a tightening sensation. States the pain was most intense last night and improved this morning and now steadily worsening.    Review of Systems:  Other Review of Systems: All other review of systems negative, except as noted in HPI	      pt seen in icu [  ], reg med floor [ x  ], bed [ x ], chair at bedside [   ], a+o x3 [x ], lethargic [  ],  nad [ x ]        Allergies    No Known Drug Allergies  Seafood (Anaphylaxis)        Vitals    T(F): 98.2 (04-30-19 @ 05:10), Max: 98.2 (04-29-19 @ 23:41)  HR: 87 (04-30-19 @ 05:10) (74 - 92)  BP: 129/74 (04-30-19 @ 05:10) (114/74 - 153/84)  RR: 17 (04-30-19 @ 05:10) (17 - 18)  SpO2: 98% (04-30-19 @ 05:10) (98% - 100%)  Wt(kg): --  CAPILLARY BLOOD GLUCOSE      POCT Blood Glucose.: 110 mg/dL (30 Apr 2019 07:50)      Labs                          15.7   11.59 )-----------( 230      ( 30 Apr 2019 06:55 )             47.6       04-30    134<L>  |  100  |  8   ----------------------------<  110<H>  3.8   |  29  |  0.91    Ca    8.5      30 Apr 2019 06:55  Phos  2.9     04-30  Mg     1.9     04-30    TPro  7.8  /  Alb  3.9  /  TBili  0.5  /  DBili  x   /  AST  12  /  ALT  28  /  AlkPhos  70  04-29                Radiology Results      < from: CT Abdomen and Pelvis w/ IV Cont (04.29.19 @ 14:21) >  IMPRESSION:    Minimal pancreatitis about the pancreatic tail. Mild prominence of the   pancreatic tail unchanged from examinations dating back to 2015 as above.    Enlarged asymmetrical prostate. Recommend urology evaluation.    Hepatomegaly and hepatic steatosis.    Mild splenomegaly.    < end of copied text >            Meds    MEDICATIONS  (STANDING):  allopurinol 300 milliGRAM(s) Oral daily  enoxaparin Injectable 40 milliGRAM(s) SubCutaneous daily  influenza   Vaccine 0.5 milliLiter(s) IntraMuscular once  lactated ringers. 1000 milliLiter(s) (200 mL/Hr) IV Continuous <Continuous>      MEDICATIONS  (PRN):  morphine  - Injectable 1 milliGRAM(s) IV Push every 4 hours PRN Severe Pain (7 - 10)      Physical Exam    Neuro :  no focal deficits  Respiratory: CTA B/L  CV: RRR, S1S2, no murmurs,   Abdominal: Soft, tender to mod palpation on epigastric and LUQ, ND +BS,  Extremities: No edema, + peripheral pulses    ASSESSMENT    Acute pancreatitis without infection or necrosis  h/o Renal stones  Gout  Diabetes        PLAN    npo  ivf  ct abd noted above Minimal pancreatitis about the pancreatic tail. Mild prominence of the pancreatic tail unchanged from examinations dating back to 2015 as above. Enlarged asymmetrical prostate.   gi cons  uro cons   cont current meds

## 2019-04-30 NOTE — CONSULT NOTE ADULT - ASSESSMENT
pancreatitis  prostate irregularity with left base reported bulging left of midline.  LLQ pain    Pt does not appear septic and no sxs of uti, and therefore dx of prostatic abcess is unlikely.   however if sxs do not significantly improve, or if evidence of infection  , pt would be advised to undergo LORENZO this admission to r/o abcess.  Dr Enriquez will also discuss with patient today during his evening rounds.  in either scenario pt is strongly advised to follow up with urology and psa evaluation and LORENZO as Outpatient.
1. Acute recurrent pancreatitis  2. Etiology for pancreatitis is not clear    Suggestions:    1. NPO  2. IVF hydration  3. Follow up lipase  4. MRI of abdomen  5. Protonix daily  6. Avoid NSAID  7. DVT prophylaxis

## 2019-05-01 LAB
ANION GAP SERPL CALC-SCNC: 5 MMOL/L — SIGNIFICANT CHANGE UP (ref 5–17)
BUN SERPL-MCNC: 8 MG/DL — SIGNIFICANT CHANGE UP (ref 7–18)
CALCIUM SERPL-MCNC: 9.3 MG/DL — SIGNIFICANT CHANGE UP (ref 8.4–10.5)
CHLORIDE SERPL-SCNC: 100 MMOL/L — SIGNIFICANT CHANGE UP (ref 96–108)
CO2 SERPL-SCNC: 33 MMOL/L — HIGH (ref 22–31)
CREAT SERPL-MCNC: 0.97 MG/DL — SIGNIFICANT CHANGE UP (ref 0.5–1.3)
GLUCOSE SERPL-MCNC: 90 MG/DL — SIGNIFICANT CHANGE UP (ref 70–99)
HCT VFR BLD CALC: 47.1 % — SIGNIFICANT CHANGE UP (ref 39–50)
HGB BLD-MCNC: 15.4 G/DL — SIGNIFICANT CHANGE UP (ref 13–17)
LIDOCAIN IGE QN: 346 U/L — SIGNIFICANT CHANGE UP (ref 73–393)
MCHC RBC-ENTMCNC: 29.1 PG — SIGNIFICANT CHANGE UP (ref 27–34)
MCHC RBC-ENTMCNC: 32.7 GM/DL — SIGNIFICANT CHANGE UP (ref 32–36)
MCV RBC AUTO: 89 FL — SIGNIFICANT CHANGE UP (ref 80–100)
NRBC # BLD: 0 /100 WBCS — SIGNIFICANT CHANGE UP (ref 0–0)
PLATELET # BLD AUTO: 215 K/UL — SIGNIFICANT CHANGE UP (ref 150–400)
POTASSIUM SERPL-MCNC: 3.6 MMOL/L — SIGNIFICANT CHANGE UP (ref 3.5–5.3)
POTASSIUM SERPL-SCNC: 3.6 MMOL/L — SIGNIFICANT CHANGE UP (ref 3.5–5.3)
RBC # BLD: 5.29 M/UL — SIGNIFICANT CHANGE UP (ref 4.2–5.8)
RBC # FLD: 13.1 % — SIGNIFICANT CHANGE UP (ref 10.3–14.5)
SODIUM SERPL-SCNC: 138 MMOL/L — SIGNIFICANT CHANGE UP (ref 135–145)
WBC # BLD: 9.41 K/UL — SIGNIFICANT CHANGE UP (ref 3.8–10.5)
WBC # FLD AUTO: 9.41 K/UL — SIGNIFICANT CHANGE UP (ref 3.8–10.5)

## 2019-05-01 PROCEDURE — 74182 MRI ABDOMEN W/CONTRAST: CPT | Mod: 26

## 2019-05-01 RX ORDER — DOCUSATE SODIUM 100 MG
100 CAPSULE ORAL DAILY
Qty: 0 | Refills: 0 | Status: DISCONTINUED | OUTPATIENT
Start: 2019-05-01 | End: 2019-05-02

## 2019-05-01 RX ORDER — SODIUM CHLORIDE 9 MG/ML
1000 INJECTION, SOLUTION INTRAVENOUS
Qty: 0 | Refills: 0 | Status: DISCONTINUED | OUTPATIENT
Start: 2019-05-01 | End: 2019-05-02

## 2019-05-01 RX ORDER — KETOROLAC TROMETHAMINE 30 MG/ML
30 SYRINGE (ML) INJECTION ONCE
Qty: 0 | Refills: 0 | Status: DISCONTINUED | OUTPATIENT
Start: 2019-05-01 | End: 2019-05-01

## 2019-05-01 RX ORDER — SENNA PLUS 8.6 MG/1
2 TABLET ORAL AT BEDTIME
Qty: 0 | Refills: 0 | Status: DISCONTINUED | OUTPATIENT
Start: 2019-05-01 | End: 2019-05-02

## 2019-05-01 RX ORDER — FAMOTIDINE 10 MG/ML
20 INJECTION INTRAVENOUS EVERY 12 HOURS
Qty: 0 | Refills: 0 | Status: DISCONTINUED | OUTPATIENT
Start: 2019-05-01 | End: 2019-05-02

## 2019-05-01 RX ADMIN — MORPHINE SULFATE 1 MILLIGRAM(S): 50 CAPSULE, EXTENDED RELEASE ORAL at 12:40

## 2019-05-01 RX ADMIN — Medication 100 MILLIGRAM(S): at 13:21

## 2019-05-01 RX ADMIN — MORPHINE SULFATE 1 MILLIGRAM(S): 50 CAPSULE, EXTENDED RELEASE ORAL at 20:55

## 2019-05-01 RX ADMIN — Medication 300 MILLIGRAM(S): at 13:22

## 2019-05-01 RX ADMIN — MORPHINE SULFATE 1 MILLIGRAM(S): 50 CAPSULE, EXTENDED RELEASE ORAL at 18:18

## 2019-05-01 RX ADMIN — FAMOTIDINE 20 MILLIGRAM(S): 10 INJECTION INTRAVENOUS at 00:14

## 2019-05-01 RX ADMIN — SENNA PLUS 2 TABLET(S): 8.6 TABLET ORAL at 21:03

## 2019-05-01 RX ADMIN — Medication 5 MILLIGRAM(S): at 06:37

## 2019-05-01 RX ADMIN — MORPHINE SULFATE 1 MILLIGRAM(S): 50 CAPSULE, EXTENDED RELEASE ORAL at 03:37

## 2019-05-01 RX ADMIN — MORPHINE SULFATE 1 MILLIGRAM(S): 50 CAPSULE, EXTENDED RELEASE ORAL at 03:13

## 2019-05-01 RX ADMIN — SODIUM CHLORIDE 200 MILLILITER(S): 9 INJECTION, SOLUTION INTRAVENOUS at 00:13

## 2019-05-01 RX ADMIN — FAMOTIDINE 20 MILLIGRAM(S): 10 INJECTION INTRAVENOUS at 06:37

## 2019-05-01 RX ADMIN — MORPHINE SULFATE 1 MILLIGRAM(S): 50 CAPSULE, EXTENDED RELEASE ORAL at 21:43

## 2019-05-01 RX ADMIN — ENOXAPARIN SODIUM 40 MILLIGRAM(S): 100 INJECTION SUBCUTANEOUS at 13:22

## 2019-05-01 RX ADMIN — MORPHINE SULFATE 1 MILLIGRAM(S): 50 CAPSULE, EXTENDED RELEASE ORAL at 16:55

## 2019-05-01 RX ADMIN — SODIUM CHLORIDE 200 MILLILITER(S): 9 INJECTION, SOLUTION INTRAVENOUS at 18:20

## 2019-05-01 RX ADMIN — Medication 30 MILLIGRAM(S): at 06:24

## 2019-05-01 RX ADMIN — FAMOTIDINE 20 MILLIGRAM(S): 10 INJECTION INTRAVENOUS at 18:19

## 2019-05-01 RX ADMIN — Medication 30 MILLIGRAM(S): at 07:12

## 2019-05-01 RX ADMIN — MORPHINE SULFATE 1 MILLIGRAM(S): 50 CAPSULE, EXTENDED RELEASE ORAL at 13:40

## 2019-05-01 NOTE — PROGRESS NOTE ADULT - SUBJECTIVE AND OBJECTIVE BOX
[   ] ICU                                          [   ] CCU                                      [ X  ] Medical Floor    Patient is comfortable. No new complaints reported, No abdominal pain, N/V, hematemesis, hematochezia, melena, fever, chills, chest pain, SOB, cough or diarrhea reported.    VITALS  Vital Signs Last 24 Hrs  T(C): 37.2 (01 May 2019 14:10), Max: 37.2 (01 May 2019 14:10)  T(F): 99 (01 May 2019 14:10), Max: 99 (01 May 2019 14:10)  HR: 100 (01 May 2019 14:10) (90 - 100)  BP: 129/74 (01 May 2019 14:10) (128/67 - 136/79)   RR: 18 (01 May 2019 14:10) (16 - 18)  SpO2: 100% (01 May 2019 14:10) (97% - 100%)       MEDICATIONS  (STANDING):  allopurinol 300 milliGRAM(s) Oral daily  docusate sodium 100 milliGRAM(s) Oral daily  enoxaparin Injectable 40 milliGRAM(s) SubCutaneous daily  famotidine Injectable 20 milliGRAM(s) IV Push every 12 hours  influenza   Vaccine 0.5 milliLiter(s) IntraMuscular once  lactated ringers. 1000 milliLiter(s) (200 mL/Hr) IV Continuous <Continuous>  senna 2 Tablet(s) Oral at bedtime    MEDICATIONS  (PRN):  morphine  - Injectable 1 milliGRAM(s) IV Push every 4 hours PRN Severe Pain (7 - 10)                            15.4   9.41  )-----------( 215      ( 01 May 2019 06:42 )             47.1       05-01    138  |  100  |  8   ----------------------------<  90  3.6   |  33<H>  |  0.97    Ca    9.3      01 May 2019 06:42  Phos  2.9     04-30  Mg     1.9     04-30

## 2019-05-01 NOTE — PROGRESS NOTE ADULT - SUBJECTIVE AND OBJECTIVE BOX
Patient is a 48y old  Male who presents with a chief complaint of Abdominal Pain (30 Apr 2019 16:09)    pt seen in icu [  ], reg med floor [  x ], bed [ x ], chair at bedside [   ], a+o x3 [  ], lethargic [  ],  nad [  ]    bains [  ], ngt [  ], peg [  ], et tube [  ], cent line [  ], picc line [  ]        Allergies    No Known Drug Allergies  Seafood (Anaphylaxis)        Vitals    T(F): 97.2 (05-01-19 @ 05:19), Max: 98.8 (04-30-19 @ 14:02)  HR: 99 (05-01-19 @ 05:19) (86 - 99)  BP: 128/67 (05-01-19 @ 05:19) (128/67 - 136/79)  RR: 18 (05-01-19 @ 05:19) (16 - 18)  SpO2: 99% (05-01-19 @ 05:19) (97% - 99%)  Wt(kg): --  CAPILLARY BLOOD GLUCOSE      POCT Blood Glucose.: 110 mg/dL (30 Apr 2019 07:50)      Labs                          15.4   9.41  )-----------( 215      ( 01 May 2019 06:42 )             47.1       05-01    138  |  100  |  8   ----------------------------<  90  3.6   |  33<H>  |  0.97    Ca    9.3      01 May 2019 06:42  Phos  2.9     04-30  Mg     1.9     04-30    TPro  7.8  /  Alb  3.9  /  TBili  0.5  /  DBili  x   /  AST  12  /  ALT  28  /  AlkPhos  70  04-29                Radiology Results      Meds    MEDICATIONS  (STANDING):  allopurinol 300 milliGRAM(s) Oral daily  docusate sodium 100 milliGRAM(s) Oral daily  enoxaparin Injectable 40 milliGRAM(s) SubCutaneous daily  famotidine Injectable 20 milliGRAM(s) IV Push every 12 hours  influenza   Vaccine 0.5 milliLiter(s) IntraMuscular once  lactated ringers. 1000 milliLiter(s) (200 mL/Hr) IV Continuous <Continuous>  senna 2 Tablet(s) Oral at bedtime      MEDICATIONS  (PRN):  morphine  - Injectable 1 milliGRAM(s) IV Push every 4 hours PRN Severe Pain (7 - 10)      Physical Exam    Neuro :  no focal deficits  Respiratory: CTA B/L  CV: RRR, S1S2, no murmurs,   Abdominal: Soft, tender to mod palpation on epigastric and LUQ, ND +BS,  Extremities: No edema, + peripheral pulses  ASSESSMENT      Acute pancreatitis without infection or necrosis  Pancreatitis  Renal stones  Gout  Diabetes  No significant past surgical history      PLAN  npo  ivf  ct abd noted above - Minimal pancreatitis about the pancreatic tail. Mild prominence of the pancreatic tail unchanged from examinations dating back to 2015 as above. Enlarged asymmetrical prostate.   for MRI Abdomen today for further evaluation.   gi cons  uro cons   cont current meds Patient is a 48y old  Male who presents with a chief complaint of Abdominal Pain (30 Apr 2019 16:09)    pt seen in icu [  ], reg med floor [  x ], bed [ x ], chair at bedside [   ], a+o x3 [  ], lethargic [  ],  nad [  ]    bains [  ], ngt [  ], peg [  ], et tube [  ], cent line [  ], picc line [  ]        Allergies    No Known Drug Allergies  Seafood (Anaphylaxis)        Vitals    T(F): 97.2 (05-01-19 @ 05:19), Max: 98.8 (04-30-19 @ 14:02)  HR: 99 (05-01-19 @ 05:19) (86 - 99)  BP: 128/67 (05-01-19 @ 05:19) (128/67 - 136/79)  RR: 18 (05-01-19 @ 05:19) (16 - 18)  SpO2: 99% (05-01-19 @ 05:19) (97% - 99%)  Wt(kg): --  CAPILLARY BLOOD GLUCOSE      POCT Blood Glucose.: 110 mg/dL (30 Apr 2019 07:50)      Labs                          15.4   9.41  )-----------( 215      ( 01 May 2019 06:42 )             47.1       05-01    138  |  100  |  8   ----------------------------<  90  3.6   |  33<H>  |  0.97    Ca    9.3      01 May 2019 06:42  Phos  2.9     04-30  Mg     1.9     04-30    TPro  7.8  /  Alb  3.9  /  TBili  0.5  /  DBili  x   /  AST  12  /  ALT  28  /  AlkPhos  70  04-29                Radiology Results  < from: CT Abdomen and Pelvis w/ IV Cont (04.29.19 @ 14:21) >  IMPRESSION:    Minimal pancreatitis about the pancreatic tail. Mild prominence of the   pancreatic tail unchangedfrom examinations dating back to 2015 as above.    Enlarged asymmetrical prostate. Recommend urology evaluation.    Hepatomegaly and hepatic steatosis.    Mild splenomegaly.    < end of copied text >      Meds    MEDICATIONS  (STANDING):  allopurinol 300 milliGRAM(s) Oral daily  docusate sodium 100 milliGRAM(s) Oral daily  enoxaparin Injectable 40 milliGRAM(s) SubCutaneous daily  famotidine Injectable 20 milliGRAM(s) IV Push every 12 hours  influenza   Vaccine 0.5 milliLiter(s) IntraMuscular once  lactated ringers. 1000 milliLiter(s) (200 mL/Hr) IV Continuous <Continuous>  senna 2 Tablet(s) Oral at bedtime      MEDICATIONS  (PRN):  morphine  - Injectable 1 milliGRAM(s) IV Push every 4 hours PRN Severe Pain (7 - 10)      Physical Exam    Neuro :  no focal deficits  Respiratory: CTA B/L  CV: RRR, S1S2, no murmurs,   Abdominal: Soft, tender to mod palpation on epigastric and LUQ, ND +BS,  Extremities: No edema, + peripheral pulses  ASSESSMENT      Acute pancreatitis without infection or necrosis  Pancreatitis  Renal stones  Gout  Diabetes  Prostate irregularity with left base reported bulging left of midline.  No significant past surgical history      PLAN  npo  ivf  ct abd noted above - Minimal pancreatitis about the pancreatic tail. Mild prominence of the pancreatic tail unchanged from examinations dating back to 2015 as above. Enlarged asymmetrical prostate.   for MRI Abdomen today for further evaluation.   gi cons noted - continue Protonix, MRI abdomen and f.u lipase.  uro cons noted - recommended LORENZO, f/u PSA as OP. Unlikely for abscess.   F/u GI  F/u Urology   Cont current meds Patient is a 48y old  Male who presents with a chief complaint of Abdominal Pain (30 Apr 2019 16:09)    pt seen in icu [  ], reg med floor [  x ], bed [ x ], chair at bedside [   ], a+o x3 [  ], lethargic [  ],  nad [  ]    bains [  ], ngt [  ], peg [  ], et tube [  ], cent line [  ], picc line [  ]        Allergies    No Known Drug Allergies  Seafood (Anaphylaxis)        Vitals    T(F): 97.2 (05-01-19 @ 05:19), Max: 98.8 (04-30-19 @ 14:02)  HR: 99 (05-01-19 @ 05:19) (86 - 99)  BP: 128/67 (05-01-19 @ 05:19) (128/67 - 136/79)  RR: 18 (05-01-19 @ 05:19) (16 - 18)  SpO2: 99% (05-01-19 @ 05:19) (97% - 99%)  Wt(kg): --  CAPILLARY BLOOD GLUCOSE      POCT Blood Glucose.: 110 mg/dL (30 Apr 2019 07:50)      Labs                          15.4   9.41  )-----------( 215      ( 01 May 2019 06:42 )             47.1       05-01    138  |  100  |  8   ----------------------------<  90  3.6   |  33<H>  |  0.97    Ca    9.3      01 May 2019 06:42  Phos  2.9     04-30  Mg     1.9     04-30    TPro  7.8  /  Alb  3.9  /  TBili  0.5  /  DBili  x   /  AST  12  /  ALT  28  /  AlkPhos  70  04-29                Radiology Results  < from: CT Abdomen and Pelvis w/ IV Cont (04.29.19 @ 14:21) >  IMPRESSION:    Minimal pancreatitis about the pancreatic tail. Mild prominence of the   pancreatic tail unchangedfrom examinations dating back to 2015 as above.    Enlarged asymmetrical prostate. Recommend urology evaluation.    Hepatomegaly and hepatic steatosis.    Mild splenomegaly.    < end of copied text >      Meds    MEDICATIONS  (STANDING):  allopurinol 300 milliGRAM(s) Oral daily  docusate sodium 100 milliGRAM(s) Oral daily  enoxaparin Injectable 40 milliGRAM(s) SubCutaneous daily  famotidine Injectable 20 milliGRAM(s) IV Push every 12 hours  influenza   Vaccine 0.5 milliLiter(s) IntraMuscular once  lactated ringers. 1000 milliLiter(s) (200 mL/Hr) IV Continuous <Continuous>  senna 2 Tablet(s) Oral at bedtime      MEDICATIONS  (PRN):  morphine  - Injectable 1 milliGRAM(s) IV Push every 4 hours PRN Severe Pain (7 - 10)      Physical Exam    Neuro :  no focal deficits  Respiratory: CTA B/L  CV: RRR, S1S2, no murmurs,   Abdominal: Soft, tender to mod palpation on epigastric and LUQ, ND +BS,  Extremities: No edema, + peripheral pulses  ASSESSMENT      Acute pancreatitis without infection or necrosis  Pancreatitis  Renal stones  Gout  Diabetes  Prostate irregularity with left base reported bulging left of midline.  No significant past surgical history      PLAN  npo  ivf  ct abd noted above - Minimal pancreatitis about the pancreatic tail. Mild prominence of the pancreatic tail unchanged from examinations dating back to 2015 as above. Enlarged asymmetrical prostate.   for MRI Abdomen today for further evaluation.   gi cons noted - recommended Protonix, MRI abdomen and f.u lipase.  uro cons noted - recommended LORENZO, f/u PSA as OP. Unlikely for abscess.   F/u GI  F/u Urology   Cont current meds Patient is a 48y old  Male who presents with a chief complaint of Abdominal Pain (30 Apr 2019 16:09)    pt seen in icu [  ], reg med floor [  x ], bed [ x ], chair at bedside [   ], a+o x3 [x  ], lethargic [  ],  nad [ x ]        Allergies    No Known Drug Allergies  Seafood (Anaphylaxis)        Vitals    T(F): 97.2 (05-01-19 @ 05:19), Max: 98.8 (04-30-19 @ 14:02)  HR: 99 (05-01-19 @ 05:19) (86 - 99)  BP: 128/67 (05-01-19 @ 05:19) (128/67 - 136/79)  RR: 18 (05-01-19 @ 05:19) (16 - 18)  SpO2: 99% (05-01-19 @ 05:19) (97% - 99%)  Wt(kg): --  CAPILLARY BLOOD GLUCOSE      POCT Blood Glucose.: 110 mg/dL (30 Apr 2019 07:50)      Labs                          15.4   9.41  )-----------( 215      ( 01 May 2019 06:42 )             47.1       05-01    138  |  100  |  8   ----------------------------<  90  3.6   |  33<H>  |  0.97    Ca    9.3      01 May 2019 06:42  Phos  2.9     04-30  Mg     1.9     04-30    TPro  7.8  /  Alb  3.9  /  TBili  0.5  /  DBili  x   /  AST  12  /  ALT  28  /  AlkPhos  70  04-29                Radiology Results  < from: CT Abdomen and Pelvis w/ IV Cont (04.29.19 @ 14:21) >  IMPRESSION:    Minimal pancreatitis about the pancreatic tail. Mild prominence of the   pancreatic tail unchangedfrom examinations dating back to 2015 as above.    Enlarged asymmetrical prostate. Recommend urology evaluation.    Hepatomegaly and hepatic steatosis.    Mild splenomegaly.    < end of copied text >      Meds    MEDICATIONS  (STANDING):  allopurinol 300 milliGRAM(s) Oral daily  docusate sodium 100 milliGRAM(s) Oral daily  enoxaparin Injectable 40 milliGRAM(s) SubCutaneous daily  famotidine Injectable 20 milliGRAM(s) IV Push every 12 hours  influenza   Vaccine 0.5 milliLiter(s) IntraMuscular once  lactated ringers. 1000 milliLiter(s) (200 mL/Hr) IV Continuous <Continuous>  senna 2 Tablet(s) Oral at bedtime      MEDICATIONS  (PRN):  morphine  - Injectable 1 milliGRAM(s) IV Push every 4 hours PRN Severe Pain (7 - 10)      Physical Exam    Neuro :  no focal deficits  Respiratory: CTA B/L  CV: RRR, S1S2, no murmurs,   Abdominal: Soft, tender to mod palpation on epigastric and LUQ, ND +BS,  Extremities: No edema, + peripheral pulses      ASSESSMENT      Acute pancreatitis without infection or necrosis  h/o Pancreatitis  Renal stones  Gout  Diabetes  Prostate irregularity with left base reported bulging left of midline.  No significant past surgical history      PLAN    npo  ivf  ct abd noted above - Minimal pancreatitis about the pancreatic tail. Mild prominence of the pancreatic tail unchanged from examinations dating back to 2015 as above. Enlarged asymmetrical prostate.   for MRI Abdomen today for further evaluation.   gi cons noted - recommended Protonix, MRI abdomen and f.u lipase.  uro cons noted - recommended LORENZO, f/u PSA as OP. Unlikely for abscess.   F/u GI  F/u Urology   Cont current meds

## 2019-05-01 NOTE — CHART NOTE - NSCHARTNOTEFT_GEN_A_CORE
EVENT: Pain/Constipation: Patient is still reporting breakthrough abdominal pain. He is also reporting constipation and that he has not had a bowel movement in 3 days. Ketorolac 30mg IVP ordered for pain and dulcolax ordered x 1 for constipation in addition to standing colace and senna while on opiate pain regimen.      OBJECTIVE:  Vital Signs Last 24 Hrs  T(C): 36.2 (01 May 2019 05:19), Max: 37.1 (30 Apr 2019 14:02)  T(F): 97.2 (01 May 2019 05:19), Max: 98.8 (30 Apr 2019 14:02)  HR: 99 (01 May 2019 05:19) (86 - 99)  BP: 128/67 (01 May 2019 05:19) (128/67 - 136/79)  BP(mean): --  RR: 18 (01 May 2019 05:19) (16 - 18)  SpO2: 99% (01 May 2019 05:19) (97% - 99%)    FOCUSED PHYSICAL EXAM:    LABS:                        15.7   11.59 )-----------( 230      ( 30 Apr 2019 06:55 )             47.6     04-30    134<L>  |  100  |  8   ----------------------------<  110<H>  3.8   |  29  |  0.91    Ca    8.5      30 Apr 2019 06:55  Phos  2.9     04-30  Mg     1.9     04-30    TPro  7.8  /  Alb  3.9  /  TBili  0.5  /  DBili  x   /  AST  12  /  ALT  28  /  AlkPhos  70  04-29      ASSESSMENT:  HPI:  47 y/o Male from home with history of gout presenting with one day history of Left sided abdominal pain. Pain is LLQ and LUQ. Patient reports associated nausea without vomiting. Patient denies Testicle pain or penile pain. Patient did not take medications prior to arrival. No surgical history. Denies chest pain, SOB. Describes pain as a tightening sensation. States the pain was most intense last night and improved this morning and now steadily worsening. (29 Apr 2019 17:34)      PLAN:   - Ketorolac 30mg IVP x1  -Dulcolax 5mg PO x1  - Senna and colace standing   -Continue IVF    FOLLOW UP / RESULT: EVENT: Pain/Constipation: Patient is still reporting breakthrough abdominal pain. He is also reporting constipation and that he has not had a bowel movement in 3 days. Ketorolac 30mg IVP ordered for pain and dulcolax ordered x 1 for constipation in addition to standing colace and senna while on opiate pain regimen.      OBJECTIVE:  Vital Signs Last 24 Hrs  T(C): 36.2 (01 May 2019 05:19), Max: 37.1 (30 Apr 2019 14:02)  T(F): 97.2 (01 May 2019 05:19), Max: 98.8 (30 Apr 2019 14:02)  HR: 99 (01 May 2019 05:19) (86 - 99)  BP: 128/67 (01 May 2019 05:19) (128/67 - 136/79)  BP(mean): --  RR: 18 (01 May 2019 05:19) (16 - 18)  SpO2: 99% (01 May 2019 05:19) (97% - 99%)    FOCUSED PHYSICAL EXAM:    LABS:                        15.7   11.59 )-----------( 230      ( 30 Apr 2019 06:55 )             47.6     04-30    134<L>  |  100  |  8   ----------------------------<  110<H>  3.8   |  29  |  0.91    Ca    8.5      30 Apr 2019 06:55  Phos  2.9     04-30  Mg     1.9     04-30    TPro  7.8  /  Alb  3.9  /  TBili  0.5  /  DBili  x   /  AST  12  /  ALT  28  /  AlkPhos  70  04-29      ASSESSMENT:  HPI:  49 y/o Male from home with history of gout presenting with one day history of Left sided abdominal pain. Pain is LLQ and LUQ. Patient reports associated nausea without vomiting. Patient denies Testicle pain or penile pain. Patient did not take medications prior to arrival. No surgical history. Denies chest pain, SOB. Describes pain as a tightening sensation. States the pain was most intense last night and improved this morning and now steadily worsening. (29 Apr 2019 17:34)      PLAN:   - Ketorolac 30mg IVP x1  -Dulcolax 5mg PO x1  - Senna and colace standing   -IV Famotidine 20mg ordered q12 as per GI reccs  -Continue IVF    FOLLOW UP / RESULT:

## 2019-05-01 NOTE — PROGRESS NOTE ADULT - SUBJECTIVE AND OBJECTIVE BOX
NP Note discussed with  Primary Attending    Patient is a 48y old  Male who presents with a chief complaint of Abdominal Pain (01 May 2019 11:46)      INTERVAL HPI/OVERNIGHT EVENTS: no new complaints    MEDICATIONS  (STANDING):  allopurinol 300 milliGRAM(s) Oral daily  docusate sodium 100 milliGRAM(s) Oral daily  enoxaparin Injectable 40 milliGRAM(s) SubCutaneous daily  famotidine Injectable 20 milliGRAM(s) IV Push every 12 hours  influenza   Vaccine 0.5 milliLiter(s) IntraMuscular once  lactated ringers. 1000 milliLiter(s) (200 mL/Hr) IV Continuous <Continuous>  senna 2 Tablet(s) Oral at bedtime    MEDICATIONS  (PRN):  morphine  - Injectable 1 milliGRAM(s) IV Push every 4 hours PRN Severe Pain (7 - 10)      __________________________________________________  REVIEW OF SYSTEMS:    CONSTITUTIONAL: No fever,   EYES: no acute visual disturbances  NECK: No pain or stiffness  RESPIRATORY: No cough; No shortness of breath  CARDIOVASCULAR: No chest pain, no palpitations  GASTROINTESTINAL: No pain. No nausea or vomiting; No diarrhea   NEUROLOGICAL: No headache or numbness, no tremors  MUSCULOSKELETAL: No joint pain, no muscle pain  GENITOURINARY: no dysuria, no frequency, no hesitancy  PSYCHIATRY: no depression , no anxiety  ALL OTHER  ROS negative        Vital Signs Last 24 Hrs  T(C): 36.2 (01 May 2019 05:19), Max: 37.1 (2019 14:02)  T(F): 97.2 (01 May 2019 05:19), Max: 98.8 (2019 14:02)  HR: 99 (01 May 2019 05:19) (86 - 99)  BP: 128/67 (01 May 2019 05:19) (128/67 - 136/79)  BP(mean): --  RR: 18 (01 May 2019 05:19) (16 - 18)  SpO2: 99% (01 May 2019 05:19) (97% - 99%)    ________________________________________________  PHYSICAL EXAM:  GENERAL: NAD  HEENT: Normocephalic;  conjunctivae and sclerae clear; moist mucous membranes;   NECK : supple  CHEST/LUNG: Clear to auscultation bilaterally with good air entry   HEART: S1 S2  regular; no murmurs, gallops or rubs  ABDOMEN: Soft, Nontender, Nondistended; Bowel sounds present  EXTREMITIES: no cyanosis; no edema; no calf tenderness  SKIN: warm and dry; no rash  NERVOUS SYSTEM:  Awake and alert; Oriented  to place, person and time ; no new deficits    _________________________________________________  LABS:                        15.4   9.41  )-----------( 215      ( 01 May 2019 06:42 )             47.1     05-01    138  |  100  |  8   ----------------------------<  90  3.6   |  33<H>  |  0.97    Ca    9.3      01 May 2019 06:42  Phos  2.9     04-30  Mg     1.9     04-30    TPro  7.8  /  Alb  3.9  /  TBili  0.5  /  DBili  x   /  AST  12  /  ALT  28  /  AlkPhos  70  04-29      Urinalysis Basic - ( 2019 12:53 )    Color: Yellow / Appearance: Clear / S.020 / pH: x  Gluc: x / Ketone: Negative  / Bili: Negative / Urobili: Negative   Blood: x / Protein: 100 / Nitrite: Negative   Leuk Esterase: Negative / RBC: 2-5 /HPF / WBC 0-2 /HPF   Sq Epi: x / Non Sq Epi: Occasional /HPF / Bacteria: Few /HPF      CAPILLARY BLOOD GLUCOSE            RADIOLOGY & ADDITIONAL TESTS:    Imaging  Reviewed:  YES/NO    Consultant(s) Notes Reviewed:   YES/ No      Plan of care was discussed with patient and /or primary care giver; all questions and concerns were addressed

## 2019-05-02 ENCOUNTER — TRANSCRIPTION ENCOUNTER (OUTPATIENT)
Age: 49
End: 2019-05-02

## 2019-05-02 VITALS
TEMPERATURE: 99 F | RESPIRATION RATE: 17 BRPM | OXYGEN SATURATION: 96 % | DIASTOLIC BLOOD PRESSURE: 74 MMHG | SYSTOLIC BLOOD PRESSURE: 132 MMHG | HEART RATE: 98 BPM

## 2019-05-02 LAB
ANION GAP SERPL CALC-SCNC: 6 MMOL/L — SIGNIFICANT CHANGE UP (ref 5–17)
BUN SERPL-MCNC: 9 MG/DL — SIGNIFICANT CHANGE UP (ref 7–18)
CALCIUM SERPL-MCNC: 8.9 MG/DL — SIGNIFICANT CHANGE UP (ref 8.4–10.5)
CHLORIDE SERPL-SCNC: 97 MMOL/L — SIGNIFICANT CHANGE UP (ref 96–108)
CO2 SERPL-SCNC: 31 MMOL/L — SIGNIFICANT CHANGE UP (ref 22–31)
CREAT SERPL-MCNC: 0.89 MG/DL — SIGNIFICANT CHANGE UP (ref 0.5–1.3)
GLUCOSE SERPL-MCNC: 81 MG/DL — SIGNIFICANT CHANGE UP (ref 70–99)
HCT VFR BLD CALC: 45.3 % — SIGNIFICANT CHANGE UP (ref 39–50)
HGB BLD-MCNC: 15 G/DL — SIGNIFICANT CHANGE UP (ref 13–17)
LIDOCAIN IGE QN: 202 U/L — SIGNIFICANT CHANGE UP (ref 73–393)
MCHC RBC-ENTMCNC: 29 PG — SIGNIFICANT CHANGE UP (ref 27–34)
MCHC RBC-ENTMCNC: 33.1 GM/DL — SIGNIFICANT CHANGE UP (ref 32–36)
MCV RBC AUTO: 87.5 FL — SIGNIFICANT CHANGE UP (ref 80–100)
NRBC # BLD: 0 /100 WBCS — SIGNIFICANT CHANGE UP (ref 0–0)
PLATELET # BLD AUTO: 235 K/UL — SIGNIFICANT CHANGE UP (ref 150–400)
POTASSIUM SERPL-MCNC: 3.4 MMOL/L — LOW (ref 3.5–5.3)
POTASSIUM SERPL-SCNC: 3.4 MMOL/L — LOW (ref 3.5–5.3)
RBC # BLD: 5.18 M/UL — SIGNIFICANT CHANGE UP (ref 4.2–5.8)
RBC # FLD: 13 % — SIGNIFICANT CHANGE UP (ref 10.3–14.5)
SODIUM SERPL-SCNC: 134 MMOL/L — LOW (ref 135–145)
WBC # BLD: 9.81 K/UL — SIGNIFICANT CHANGE UP (ref 3.8–10.5)
WBC # FLD AUTO: 9.81 K/UL — SIGNIFICANT CHANGE UP (ref 3.8–10.5)

## 2019-05-02 PROCEDURE — 74177 CT ABD & PELVIS W/CONTRAST: CPT

## 2019-05-02 PROCEDURE — 83036 HEMOGLOBIN GLYCOSYLATED A1C: CPT

## 2019-05-02 PROCEDURE — 84443 ASSAY THYROID STIM HORMONE: CPT

## 2019-05-02 PROCEDURE — 82746 ASSAY OF FOLIC ACID SERUM: CPT

## 2019-05-02 PROCEDURE — 84100 ASSAY OF PHOSPHORUS: CPT

## 2019-05-02 PROCEDURE — 84478 ASSAY OF TRIGLYCERIDES: CPT

## 2019-05-02 PROCEDURE — 80048 BASIC METABOLIC PNL TOTAL CA: CPT

## 2019-05-02 PROCEDURE — 81001 URINALYSIS AUTO W/SCOPE: CPT

## 2019-05-02 PROCEDURE — 96375 TX/PRO/DX INJ NEW DRUG ADDON: CPT

## 2019-05-02 PROCEDURE — 82962 GLUCOSE BLOOD TEST: CPT

## 2019-05-02 PROCEDURE — 36415 COLL VENOUS BLD VENIPUNCTURE: CPT

## 2019-05-02 PROCEDURE — 74182 MRI ABDOMEN W/CONTRAST: CPT

## 2019-05-02 PROCEDURE — 80053 COMPREHEN METABOLIC PANEL: CPT

## 2019-05-02 PROCEDURE — 80061 LIPID PANEL: CPT

## 2019-05-02 PROCEDURE — 83690 ASSAY OF LIPASE: CPT

## 2019-05-02 PROCEDURE — 85027 COMPLETE CBC AUTOMATED: CPT

## 2019-05-02 PROCEDURE — 83735 ASSAY OF MAGNESIUM: CPT

## 2019-05-02 PROCEDURE — 82607 VITAMIN B-12: CPT

## 2019-05-02 PROCEDURE — 96374 THER/PROPH/DIAG INJ IV PUSH: CPT

## 2019-05-02 PROCEDURE — 99285 EMERGENCY DEPT VISIT HI MDM: CPT | Mod: 25

## 2019-05-02 RX ORDER — PANTOPRAZOLE SODIUM 20 MG/1
1 TABLET, DELAYED RELEASE ORAL
Qty: 30 | Refills: 0 | OUTPATIENT
Start: 2019-05-02 | End: 2019-05-31

## 2019-05-02 RX ORDER — POTASSIUM CHLORIDE 20 MEQ
40 PACKET (EA) ORAL ONCE
Qty: 0 | Refills: 0 | Status: COMPLETED | OUTPATIENT
Start: 2019-05-02 | End: 2019-05-02

## 2019-05-02 RX ORDER — FAMOTIDINE 10 MG/ML
1 INJECTION INTRAVENOUS
Qty: 60 | Refills: 0 | OUTPATIENT
Start: 2019-05-02 | End: 2019-05-31

## 2019-05-02 RX ADMIN — MORPHINE SULFATE 1 MILLIGRAM(S): 50 CAPSULE, EXTENDED RELEASE ORAL at 01:45

## 2019-05-02 RX ADMIN — MORPHINE SULFATE 1 MILLIGRAM(S): 50 CAPSULE, EXTENDED RELEASE ORAL at 05:48

## 2019-05-02 RX ADMIN — Medication 100 MILLIGRAM(S): at 11:24

## 2019-05-02 RX ADMIN — ENOXAPARIN SODIUM 40 MILLIGRAM(S): 100 INJECTION SUBCUTANEOUS at 11:24

## 2019-05-02 RX ADMIN — MORPHINE SULFATE 1 MILLIGRAM(S): 50 CAPSULE, EXTENDED RELEASE ORAL at 06:46

## 2019-05-02 RX ADMIN — MORPHINE SULFATE 1 MILLIGRAM(S): 50 CAPSULE, EXTENDED RELEASE ORAL at 01:08

## 2019-05-02 RX ADMIN — FAMOTIDINE 20 MILLIGRAM(S): 10 INJECTION INTRAVENOUS at 17:24

## 2019-05-02 RX ADMIN — FAMOTIDINE 20 MILLIGRAM(S): 10 INJECTION INTRAVENOUS at 05:48

## 2019-05-02 RX ADMIN — Medication 300 MILLIGRAM(S): at 11:24

## 2019-05-02 RX ADMIN — Medication 40 MILLIEQUIVALENT(S): at 10:08

## 2019-05-02 RX ADMIN — SODIUM CHLORIDE 200 MILLILITER(S): 9 INJECTION, SOLUTION INTRAVENOUS at 01:12

## 2019-05-02 NOTE — DISCHARGE NOTE PROVIDER - NSDCCPCAREPLAN_GEN_ALL_CORE_FT
PRINCIPAL DISCHARGE DIAGNOSIS  Diagnosis: Acute pancreatitis without infection or necrosis, unspecified pancreatitis type  Assessment and Plan of Treatment: You were admitted with pancreatitis. You are now free from from pain. You will need an endoscopic ultrasound to investigate further findings on the MRI report.  Please avoid alcohol. Please follow up with your GI doctor within one week.      SECONDARY DISCHARGE DIAGNOSES  Diagnosis: Prostate asymmetry  Assessment and Plan of Treatment: Please follow up with urology as an outpatient.    Diagnosis: Gout  Assessment and Plan of Treatment: Please continue taking your allupurinol and follow up with primary care doctor. PRINCIPAL DISCHARGE DIAGNOSIS  Diagnosis: Acute pancreatitis without infection or necrosis, unspecified pancreatitis type  Assessment and Plan of Treatment: You were admitted with pancreatitis. You are now free from from pain. You will need an endoscopic ultrasound after complete resolution of your pancreatitis to investigate suspicious findings on the MRI report.  Please avoid alcohol. Please follow up with your GI doctor within one week.      SECONDARY DISCHARGE DIAGNOSES  Diagnosis: Prostate asymmetry  Assessment and Plan of Treatment: Please follow up with urology as an outpatient.    Diagnosis: Gout  Assessment and Plan of Treatment: Please continue taking your allupurinol and follow up with primary care doctor.    Diagnosis: Kidney lesion  Assessment and Plan of Treatment: Your MRI found a small cystic lesion on your kidney. You may follow up with your primary care doctor for repeat imaging in 6 months if necessary. PRINCIPAL DISCHARGE DIAGNOSIS  Diagnosis: Acute pancreatitis without infection or necrosis, unspecified pancreatitis type  Assessment and Plan of Treatment: You were admitted with pancreatitis. You are now free from from pain. You will need an endoscopic ultrasound after complete resolution of your pancreatitis to investigate suspicious findings on the MRI report.  Please avoid alcohol. Please follow up with your GI doctor within one week. Please avoid medications that can harm your GI tract such as NSAIDs (ibuprofen, aleve, advil, etc.).      SECONDARY DISCHARGE DIAGNOSES  Diagnosis: Prostate asymmetry  Assessment and Plan of Treatment: Please follow up with urology as an outpatient.    Diagnosis: Gout  Assessment and Plan of Treatment: Please continue taking your allupurinol and follow up with primary care doctor.    Diagnosis: Kidney lesion  Assessment and Plan of Treatment: Your MRI found a small cystic lesion on your kidney. You may follow up with your primary care doctor for repeat imaging in 6 months if necessary.

## 2019-05-02 NOTE — PROGRESS NOTE ADULT - SUBJECTIVE AND OBJECTIVE BOX
NP Note discussed with  Primary Attending    Patient is a 48y old  Male who presents with a chief complaint of Abdominal Pain (02 May 2019 10:35)      INTERVAL HPI/OVERNIGHT EVENTS: no new complaints    MEDICATIONS  (STANDING):  allopurinol 300 milliGRAM(s) Oral daily  docusate sodium 100 milliGRAM(s) Oral daily  enoxaparin Injectable 40 milliGRAM(s) SubCutaneous daily  famotidine Injectable 20 milliGRAM(s) IV Push every 12 hours  influenza   Vaccine 0.5 milliLiter(s) IntraMuscular once  senna 2 Tablet(s) Oral at bedtime    MEDICATIONS  (PRN):  morphine  - Injectable 1 milliGRAM(s) IV Push every 4 hours PRN Severe Pain (7 - 10)      __________________________________________________  REVIEW OF SYSTEMS:    CONSTITUTIONAL: No fever,   EYES: no acute visual disturbances  NECK: No pain or stiffness  RESPIRATORY: No cough; No shortness of breath  CARDIOVASCULAR: No chest pain, no palpitations  GASTROINTESTINAL: No pain. No nausea or vomiting; No diarrhea   NEUROLOGICAL: No headache or numbness, no tremors  MUSCULOSKELETAL: No joint pain, no muscle pain  GENITOURINARY: no dysuria, no frequency, no hesitancy  PSYCHIATRY: no depression , no anxiety  ALL OTHER  ROS negative        Vital Signs Last 24 Hrs  T(C): 36.2 (02 May 2019 05:25), Max: 37.2 (01 May 2019 14:10)  T(F): 97.2 (02 May 2019 05:25), Max: 99 (01 May 2019 14:10)  HR: 110 (02 May 2019 05:25) (96 - 110)  BP: 123/81 (02 May 2019 05:25) (123/81 - 140/93)  BP(mean): --  RR: 18 (02 May 2019 05:25) (18 - 18)  SpO2: 95% (02 May 2019 05:25) (95% - 100%)    ________________________________________________  PHYSICAL EXAM:  GENERAL: NAD  HEENT: Normocephalic;  conjunctivae and sclerae clear; moist mucous membranes;   NECK : supple  CHEST/LUNG: Clear to auscultation bilaterally with good air entry   HEART: S1 S2  regular; no murmurs, gallops or rubs  ABDOMEN: Soft, Nontender, Nondistended; Bowel sounds present  EXTREMITIES: no cyanosis; no edema; no calf tenderness  SKIN: warm and dry; no rash  NERVOUS SYSTEM:  Awake and alert; Oriented  to place, person and time ; no new deficits    _________________________________________________  LABS:                        15.0   9.81  )-----------( 235      ( 02 May 2019 07:09 )             45.3     05-02    134<L>  |  97  |  9   ----------------------------<  81  3.4<L>   |  31  |  0.89    Ca    8.9      02 May 2019 07:09          CAPILLARY BLOOD GLUCOSE      RADIOLOGY & ADDITIONAL TESTS:  < from: MR Abdomen w/ IV Cont (05.01.19 @ 11:57) >  Impression:  Mild edema adjacent to the pancreatic tail, compatible with   patient's known acute pancreatitis. No evidence for pancreatic necrosis.   In the pancreatic tail, there is a 4.4 x 2.7 cm hyperintense area on   diffusion-weighted sequences. This area has loss of interdigitating fat.   In addition, this area appears to enhance slightly more on postcontrast   images relative to the rest of the pancreatic parenchyma. This may be due   to acute focal pancreatitis, fibrosis from chronic pancreatitis, or may   represent a pancreatic neoplasm. If clinically indicated, endoscopic   ultrasound may be pursued for further evaluation after resolution of   acute pancreatitis.    Mild splenomegaly.    Apparent Bosniak 2F cystic lesion in the right kidney. This can be   followed with abdominal MR in 6 month.    Trace ascites.      < end of copied text >        Imaging  Reviewed:  YES    Consultant(s) Notes Reviewed:   YES      Plan of care was discussed with patient and /or primary care giver; all questions and concerns were addressed

## 2019-05-02 NOTE — PROGRESS NOTE ADULT - GASTROINTESTINAL DETAILS
no guarding/bowel sounds normal/soft/no distention/nontender/no rebound tenderness/no rigidity
no guarding/soft/no distention/bowel sounds normal/nontender/no rebound tenderness/no rigidity

## 2019-05-02 NOTE — PROGRESS NOTE ADULT - SUBJECTIVE AND OBJECTIVE BOX
Patient is a 48y old  Male who presents with a chief complaint of Abdominal Pain (01 May 2019 18:19)        pt seen in icu [  ], reg med floor [  x ], bed [ x ], chair at bedside [   ], a+o x3 [x  ], lethargic [  ],  nad [ x ]      Allergies    No Known Drug Allergies  Seafood (Anaphylaxis)        Vitals    T(F): 97.2 (05-02-19 @ 05:25), Max: 99 (05-01-19 @ 14:10)  HR: 110 (05-02-19 @ 05:25) (96 - 110)  BP: 123/81 (05-02-19 @ 05:25) (123/81 - 140/93)  RR: 18 (05-02-19 @ 05:25) (18 - 18)  SpO2: 95% (05-02-19 @ 05:25) (95% - 100%)  Wt(kg): --  CAPILLARY BLOOD GLUCOSE          Labs                          15.0   9.81  )-----------( 235      ( 02 May 2019 07:09 )             45.3       05-02    134<L>  |  97  |  9   ----------------------------<  81  3.4<L>   |  31  |  0.89    Ca    8.9      02 May 2019 07:09                  Radiology Results      Meds    MEDICATIONS  (STANDING):  allopurinol 300 milliGRAM(s) Oral daily  docusate sodium 100 milliGRAM(s) Oral daily  enoxaparin Injectable 40 milliGRAM(s) SubCutaneous daily  famotidine Injectable 20 milliGRAM(s) IV Push every 12 hours  influenza   Vaccine 0.5 milliLiter(s) IntraMuscular once  senna 2 Tablet(s) Oral at bedtime      MEDICATIONS  (PRN):  morphine  - Injectable 1 milliGRAM(s) IV Push every 4 hours PRN Severe Pain (7 - 10)      Physical Exam    Neuro :  no focal deficits  Respiratory: CTA B/L  CV: RRR, S1S2, no murmurs,   Abdominal: Soft, NT, ND +BS,  Extremities: No edema, + peripheral pulses      ASSESSMENT      Acute pancreatitis without infection or necrosis  h/o Pancreatitis  Renal stones  Gout  Diabetes  Prostate irregularity with left base reported bulging left of midline.  No significant past surgical history      PLAN    advance diet   ivf  ct abd noted above - Minimal pancreatitis about the pancreatic tail. Mild prominence of the pancreatic tail unchanged from examinations dating back to 2015 as above. Enlarged asymmetrical prostate.   for MRI Abdomen today for further evaluation.   gi cons noted - recommended Protonix, MRI abdomen and f.u lipase.  uro cons noted - recommended LORENZO, f/u PSA as OP. Unlikely for abscess.   F/u GI  F/u Urology   supplement K for hypoKalemia  supplement Na for hypoNatremia   Cont current meds   d/c planning if pt tolerates advanced diet

## 2019-05-02 NOTE — DISCHARGE NOTE PROVIDER - HOSPITAL COURSE
HPI: 49 y/o Male from home with history of gout presenting with one day history of Left sided abdominal pain. Pain is LLQ and LUQ. Patient reports associated nausea without vomiting. Patient denies Testicle pain or penile pain. Patient did not take medications prior to arrival. No surgical history. Denies chest pain, SOB. Describes pain as a tightening sensation. States the pain was most intense last night and improved this morning and now steadily worsening. Denies alcohol abuse        ED course: Patient examined at bedside in NAD. Complaining of Left sided abdominal Pain     VS T(C): 36.7 HR: 74 BP: 114/74 RR: 17 SpO2: 100%     Lipase 788. Physical exam as above with pertinent findings of Left sided abdominal tenderness. Imaging significant for CT of the abdomen: Minimal pancreatitis about the pancreatic tail. No gallstones        Patient will be admitted to the floor due to acute pancreatitis. NPO, IV fluids, and pain management. Lipase trended down to normal. Pt had MRI which showed:         Mild edema adjacent to the pancreatic tail, compatible with     patient's known acute pancreatitis. No evidence for pancreatic necrosis.     In the pancreatic tail, there is a 4.4 x 2.7 cm hyperintense area on     diffusion-weighted sequences. This area has loss of interdigitating fat.     In addition, this area appears to enhance slightly more on postcontrast     images relative to the rest of the pancreatic parenchyma. This may be due     to acute focal pancreatitis, fibrosis from chronic pancreatitis, or may     represent a pancreatic neoplasm.        Mild splenomegaly. Apparent Bosniak 2F cystic lesion in the right kidney. This can be     followed with abdominal MR in 6 month. Trace ascites.        Pt will follow up with GI outpatient to have endoscopic ultrasound after full resolution of pancreatitis. HPI: 47 y/o Male from home with history of gout presenting with one day history of Left sided abdominal pain. Pain is LLQ and LUQ. Patient reports associated nausea without vomiting. Patient denies Testicle pain or penile pain. Patient did not take medications prior to arrival. No surgical history. Denies chest pain, SOB. Describes pain as a tightening sensation. States the pain was most intense last night and improved this morning and now steadily worsening. Denies alcohol abuse        ED course: Patient examined at bedside in NAD. Complaining of Left sided abdominal Pain     VS T(C): 36.7 HR: 74 BP: 114/74 RR: 17 SpO2: 100%     Lipase 788. Physical exam as above with pertinent findings of Left sided abdominal tenderness. Imaging significant for CT of the abdomen: Minimal pancreatitis about the pancreatic tail. No gallstones        Patient will be admitted to the floor due to acute pancreatitis. NPO, IV fluids, and pain management. Lipase trended down to normal. Pt had MRI which showed:         Mild edema adjacent to the pancreatic tail, compatible with patient's known acute pancreatitis. No evidence for pancreatic necrosis. In the pancreatic tail, there is a 4.4 x 2.7 cm hyperintense area on diffusion-weighted sequences. This area has loss of interdigitating fat.  In addition, this area appears to enhance slightly more on postcontrast images relative to the rest of the pancreatic parenchyma. This may be due to acute focal pancreatitis, fibrosis from chronic pancreatitis, or may  represent a pancreatic neoplasm. mMild splenomegaly. Apparent Bosniak 2F cystic lesion in the right kidney. This can be followed with abdominal MR in 6 month. Trace ascites.        Pt will follow up with GI outpatient to have endoscopic ultrasound after full resolution of pancreatitis.         Pt is cleared for discharge.

## 2019-05-02 NOTE — DISCHARGE NOTE NURSING/CASE MANAGEMENT/SOCIAL WORK - NSDPLANG ASIS_GEN_ALL_CORE
Spoke with daughter, [facility]. She states Pt has shingles, went to walk in clinic (6/11/17) and was given Lidocaine 5% patches for the pain. Per daughter, lidocaine patch doesn't last long.  They apply to Pt at 830am, and removed at 830pm. Pt has extreme pain i No

## 2019-05-02 NOTE — PROGRESS NOTE ADULT - PROBLEM SELECTOR PLAN 1
Lipase normal today   MRI as above  Pt free from abdominal pain  Fluids dc'd, diet advanced to clears  GI Dr. Roa
Lipase normal today   MRI pending   NPO   LR @ 200   pain management   Pepcid 20mg IV q12   GI Dr. Roa
NPO   LR @ 200   pain management   Pepcid 20mg IV q12   GI Dr. Roa

## 2019-05-02 NOTE — DISCHARGE NOTE PROVIDER - CARE PROVIDER_API CALL
Gabriel Roa)  Medicine  [address] [place] [id number]  Phone: [phone]  Fax: [phone]  Follow Up Time:     Geena Giraldo)  Internal Medicine  [address] [place] [address]  Phone: [phone]  Fax: [phone]  Follow Up Time:

## 2019-05-02 NOTE — PROGRESS NOTE ADULT - NEGATIVE NEUROLOGICAL SYMPTOMS
no headache/no vertigo/no loss of sensation/no difficulty walking/no syncope/no tremors/no confusion

## 2019-05-02 NOTE — PROGRESS NOTE ADULT - REASON FOR ADMISSION
Abdominal Pain

## 2019-05-02 NOTE — PROGRESS NOTE ADULT - NEUROLOGICAL DETAILS
responds to verbal commands/responds to pain/sensation intact
sensation intact/responds to pain/alert and oriented x 3/responds to verbal commands

## 2019-05-02 NOTE — PROGRESS NOTE ADULT - PROBLEM SELECTOR PROBLEM 1
Acute pancreatitis without infection or necrosis, unspecified pancreatitis type

## 2019-05-02 NOTE — PROGRESS NOTE ADULT - PROVIDER SPECIALTY LIST ADULT
Gastroenterology
Gastroenterology
Internal Medicine

## 2019-05-02 NOTE — PROGRESS NOTE ADULT - SUBJECTIVE AND OBJECTIVE BOX
[   ] ICU                                          [   ] CCU                                      [ X  ] Medical Floor    Patient is comfortable. No new complaints reported, No abdominal pain, N/V, hematemesis, hematochezia, melena, fever, chills, chest pain, SOB, cough or diarrhea reported.    VITALS  Vital Signs Last 24 Hrs  T(C): 37.2 (02 May 2019 14:03), Max: 37.2 (02 May 2019 14:03)  T(F): 99 (02 May 2019 14:03), Max: 99 (02 May 2019 14:03)  HR: 98 (02 May 2019 14:03) (96 - 110)  BP: 132/74 (02 May 2019 14:03) (123/81 - 140/93)   RR: 17 (02 May 2019 14:03) (17 - 18)  SpO2: 96% (02 May 2019 14:03) (95% - 98%)           MEDICATIONS  (STANDING):  allopurinol 300 milliGRAM(s) Oral daily  docusate sodium 100 milliGRAM(s) Oral daily  enoxaparin Injectable 40 milliGRAM(s) SubCutaneous daily  famotidine Injectable 20 milliGRAM(s) IV Push every 12 hours  influenza   Vaccine 0.5 milliLiter(s) IntraMuscular once  senna 2 Tablet(s) Oral at bedtime    MEDICATIONS  (PRN):  morphine  - Injectable 1 milliGRAM(s) IV Push every 4 hours PRN Severe Pain (7 - 10)                            15.0   9.81  )-----------( 235      ( 02 May 2019 07:09 )             45.3       05-02    134<L>  |  97  |  9   ----------------------------<  81  3.4<L>   |  31  |  0.89    Ca    8.9      02 May 2019 07:09

## 2019-05-02 NOTE — PROGRESS NOTE ADULT - NEGATIVE GASTROINTESTINAL SYMPTOMS
no diarrhea/no abdominal pain/no nausea/no vomiting/no hematochezia
no abdominal pain/no vomiting/no diarrhea/no melena/no hematochezia/no jaundice/no nausea

## 2019-05-02 NOTE — PROGRESS NOTE ADULT - PROBLEM SELECTOR PLAN 3
Seen by urology, will follow up outpatient
CT scan with enlarged asymmetrical prostate  Urology called
Seen by urology, will follow up outpatient

## 2019-05-02 NOTE — DISCHARGE NOTE NURSING/CASE MANAGEMENT/SOCIAL WORK - NSDCDPATPORTLINK_GEN_ALL_CORE
You can access the [facility] Portal, offered by [facility], by registering with the following website: [email or web address]

## 2019-05-02 NOTE — PROGRESS NOTE ADULT - RS GEN PE MLT RESP DETAILS PC
good air movement/breath sounds equal/airway patent
breath sounds equal/good air movement/airway patent

## 2019-05-02 NOTE — PROGRESS NOTE ADULT - ASSESSMENT
49 y/o Male from home with history of gout presenting with one day history of Left sided abdominal pain. Pain is LLQ and LUQ. Patient reports associated nausea without vomiting. Patient denies Testicle pain or penile pain. Patient did not take medications prior to arrival. No surgical history. Denies chest pain, SOB. Describes pain as a tightening sensation. States the pain was most intense last night and improved this morning and now steadily worsening. Denies alcohol abuse.     Imaging significant for CT of the abdomen: Minimal pancreatitis about the pancreatic tail. No gallstones. Patient will be admitted to the floor due to acute pancreatitis
47 y/o Male from home with history of gout presenting with one day history of Left sided abdominal pain. Pain is LLQ and LUQ. Patient reports associated nausea without vomiting. Patient denies Testicle pain or penile pain. Patient did not take medications prior to arrival. No surgical history. Denies chest pain, SOB. Describes pain as a tightening sensation. States the pain was most intense last night and improved this morning and now steadily worsening. Denies alcohol abuse.     Imaging significant for CT of the abdomen: Minimal pancreatitis about the pancreatic tail. No gallstones. Patient will be admitted to the floor due to acute pancreatitis
49 y/o Male from home with history of gout presenting with one day history of Left sided abdominal pain. Pain is LLQ and LUQ. Patient reports associated nausea without vomiting. Patient denies Testicle pain or penile pain. Patient did not take medications prior to arrival. No surgical history. Denies chest pain, SOB. Describes pain as a tightening sensation. States the pain was most intense last night and improved this morning and now steadily worsening. Denies alcohol abuse.     Imaging significant for CT of the abdomen: Minimal pancreatitis about the pancreatic tail. No gallstones. Patient will be admitted to the floor due to acute pancreatitis
1. Abdominal pain improving  2. Pancreatitis improving    Suggestions:    1. Advance diet as tolerated  2. Protonix daily  3. Avoid NSAID  4. Check   5. DVT prophylaxis
1. Abdominal pain improved  2. Pancreatitis improving    Suggestions:    1. Advance diet as tolerated  2. Protonix daily  3. Avoid NSAID  4. DVT prophylaxis

## 2019-05-02 NOTE — PROGRESS NOTE ADULT - NEGATIVE ENMT SYMPTOMS
no ear pain/no hearing difficulty/no nose bleeds/no dry mouth/no throat pain/no dysphagia
no hearing difficulty/no dry mouth/no ear pain/no gum bleeding/no throat pain/no dysphagia/no nose bleeds

## 2019-05-02 NOTE — PROGRESS NOTE ADULT - NEGATIVE GENERAL SYMPTOMS
no fever/no chills/no sweating/no anorexia
no chills/no weight loss/no sweating/no anorexia/no fever

## 2019-05-04 ENCOUNTER — EMERGENCY (EMERGENCY)
Facility: HOSPITAL | Age: 49
LOS: 1 days | Discharge: HOME | End: 2019-05-04
Attending: EMERGENCY MEDICINE
Payer: COMMERCIAL

## 2019-05-04 VITALS
WEIGHT: 210.1 LBS | HEART RATE: 101 BPM | DIASTOLIC BLOOD PRESSURE: 86 MMHG | RESPIRATION RATE: 16 BRPM | TEMPERATURE: 98 F | HEIGHT: 69 IN | SYSTOLIC BLOOD PRESSURE: 132 MMHG | OXYGEN SATURATION: 100 %

## 2019-05-04 VITALS
SYSTOLIC BLOOD PRESSURE: 130 MMHG | DIASTOLIC BLOOD PRESSURE: 85 MMHG | HEART RATE: 97 BPM | OXYGEN SATURATION: 98 % | RESPIRATION RATE: 16 BRPM

## 2019-05-04 LAB
ALBUMIN SERPL ELPH-MCNC: 3.4 G/DL — LOW (ref 3.5–5)
ALP SERPL-CCNC: 93 U/L — SIGNIFICANT CHANGE UP (ref 40–120)
ALT FLD-CCNC: 65 U/L DA — HIGH (ref 10–60)
ANION GAP SERPL CALC-SCNC: 8 MMOL/L — SIGNIFICANT CHANGE UP (ref 5–17)
APPEARANCE UR: CLEAR — SIGNIFICANT CHANGE UP
AST SERPL-CCNC: 32 U/L — SIGNIFICANT CHANGE UP (ref 10–40)
BASOPHILS # BLD AUTO: 0.03 K/UL — SIGNIFICANT CHANGE UP (ref 0–0.2)
BASOPHILS NFR BLD AUTO: 0.3 % — SIGNIFICANT CHANGE UP (ref 0–2)
BILIRUB SERPL-MCNC: 0.7 MG/DL — SIGNIFICANT CHANGE UP (ref 0.2–1.2)
BILIRUB UR-MCNC: ABNORMAL
BUN SERPL-MCNC: 19 MG/DL — HIGH (ref 7–18)
CALCIUM SERPL-MCNC: 9 MG/DL — SIGNIFICANT CHANGE UP (ref 8.4–10.5)
CHLORIDE SERPL-SCNC: 102 MMOL/L — SIGNIFICANT CHANGE UP (ref 96–108)
CO2 SERPL-SCNC: 27 MMOL/L — SIGNIFICANT CHANGE UP (ref 22–31)
COLOR SPEC: YELLOW — SIGNIFICANT CHANGE UP
CREAT SERPL-MCNC: 1.34 MG/DL — HIGH (ref 0.5–1.3)
DIFF PNL FLD: ABNORMAL
EOSINOPHIL # BLD AUTO: 0.47 K/UL — SIGNIFICANT CHANGE UP (ref 0–0.5)
EOSINOPHIL NFR BLD AUTO: 4.4 % — SIGNIFICANT CHANGE UP (ref 0–6)
GLUCOSE SERPL-MCNC: 116 MG/DL — HIGH (ref 70–99)
GLUCOSE UR QL: NEGATIVE — SIGNIFICANT CHANGE UP
HCT VFR BLD CALC: 48.7 % — SIGNIFICANT CHANGE UP (ref 39–50)
HGB BLD-MCNC: 15.8 G/DL — SIGNIFICANT CHANGE UP (ref 13–17)
IMM GRANULOCYTES NFR BLD AUTO: 0.6 % — SIGNIFICANT CHANGE UP (ref 0–1.5)
KETONES UR-MCNC: NEGATIVE — SIGNIFICANT CHANGE UP
LEUKOCYTE ESTERASE UR-ACNC: ABNORMAL
LIDOCAIN IGE QN: 311 U/L — SIGNIFICANT CHANGE UP (ref 73–393)
LYMPHOCYTES # BLD AUTO: 1.1 K/UL — SIGNIFICANT CHANGE UP (ref 1–3.3)
LYMPHOCYTES # BLD AUTO: 10.3 % — LOW (ref 13–44)
MCHC RBC-ENTMCNC: 28.8 PG — SIGNIFICANT CHANGE UP (ref 27–34)
MCHC RBC-ENTMCNC: 32.4 GM/DL — SIGNIFICANT CHANGE UP (ref 32–36)
MCV RBC AUTO: 88.9 FL — SIGNIFICANT CHANGE UP (ref 80–100)
MONOCYTES # BLD AUTO: 1.29 K/UL — HIGH (ref 0–0.9)
MONOCYTES NFR BLD AUTO: 12 % — SIGNIFICANT CHANGE UP (ref 2–14)
NEUTROPHILS # BLD AUTO: 7.78 K/UL — HIGH (ref 1.8–7.4)
NEUTROPHILS NFR BLD AUTO: 72.4 % — SIGNIFICANT CHANGE UP (ref 43–77)
NITRITE UR-MCNC: NEGATIVE — SIGNIFICANT CHANGE UP
NRBC # BLD: 0 /100 WBCS — SIGNIFICANT CHANGE UP (ref 0–0)
PH UR: 5 — SIGNIFICANT CHANGE UP (ref 5–8)
PLATELET # BLD AUTO: 276 K/UL — SIGNIFICANT CHANGE UP (ref 150–400)
POTASSIUM SERPL-MCNC: 3.8 MMOL/L — SIGNIFICANT CHANGE UP (ref 3.5–5.3)
POTASSIUM SERPL-SCNC: 3.8 MMOL/L — SIGNIFICANT CHANGE UP (ref 3.5–5.3)
PROT SERPL-MCNC: 8.3 G/DL — SIGNIFICANT CHANGE UP (ref 6–8.3)
PROT UR-MCNC: 100
RBC # BLD: 5.48 M/UL — SIGNIFICANT CHANGE UP (ref 4.2–5.8)
RBC # FLD: 12.9 % — SIGNIFICANT CHANGE UP (ref 10.3–14.5)
SODIUM SERPL-SCNC: 137 MMOL/L — SIGNIFICANT CHANGE UP (ref 135–145)
SP GR SPEC: 1.02 — SIGNIFICANT CHANGE UP (ref 1.01–1.02)
TROPONIN I SERPL-MCNC: <0.015 NG/ML — SIGNIFICANT CHANGE UP (ref 0–0.04)
UROBILINOGEN FLD QL: 4
WBC # BLD: 10.73 K/UL — HIGH (ref 3.8–10.5)
WBC # FLD AUTO: 10.73 K/UL — HIGH (ref 3.8–10.5)

## 2019-05-04 PROCEDURE — 36415 COLL VENOUS BLD VENIPUNCTURE: CPT

## 2019-05-04 PROCEDURE — 84484 ASSAY OF TROPONIN QUANT: CPT

## 2019-05-04 PROCEDURE — 81001 URINALYSIS AUTO W/SCOPE: CPT

## 2019-05-04 PROCEDURE — 99284 EMERGENCY DEPT VISIT MOD MDM: CPT | Mod: 25

## 2019-05-04 PROCEDURE — 85027 COMPLETE CBC AUTOMATED: CPT

## 2019-05-04 PROCEDURE — 99285 EMERGENCY DEPT VISIT HI MDM: CPT | Mod: 25

## 2019-05-04 PROCEDURE — 83690 ASSAY OF LIPASE: CPT

## 2019-05-04 PROCEDURE — 96374 THER/PROPH/DIAG INJ IV PUSH: CPT

## 2019-05-04 PROCEDURE — 80053 COMPREHEN METABOLIC PANEL: CPT

## 2019-05-04 RX ORDER — CYCLOBENZAPRINE HYDROCHLORIDE 10 MG/1
1 TABLET, FILM COATED ORAL
Qty: 10 | Refills: 0 | OUTPATIENT
Start: 2019-05-04

## 2019-05-04 RX ORDER — SODIUM CHLORIDE 9 MG/ML
1000 INJECTION INTRAMUSCULAR; INTRAVENOUS; SUBCUTANEOUS ONCE
Qty: 0 | Refills: 0 | Status: COMPLETED | OUTPATIENT
Start: 2019-05-04 | End: 2019-05-04

## 2019-05-04 RX ORDER — CYCLOBENZAPRINE HYDROCHLORIDE 10 MG/1
10 TABLET, FILM COATED ORAL ONCE
Qty: 0 | Refills: 0 | Status: COMPLETED | OUTPATIENT
Start: 2019-05-04 | End: 2019-05-04

## 2019-05-04 RX ORDER — ACETAMINOPHEN 500 MG
1000 TABLET ORAL ONCE
Qty: 0 | Refills: 0 | Status: COMPLETED | OUTPATIENT
Start: 2019-05-04 | End: 2019-05-04

## 2019-05-04 RX ADMIN — SODIUM CHLORIDE 1000 MILLILITER(S): 9 INJECTION INTRAMUSCULAR; INTRAVENOUS; SUBCUTANEOUS at 07:58

## 2019-05-04 RX ADMIN — Medication 1000 MILLIGRAM(S): at 07:58

## 2019-05-04 RX ADMIN — SODIUM CHLORIDE 1000 MILLILITER(S): 9 INJECTION INTRAMUSCULAR; INTRAVENOUS; SUBCUTANEOUS at 06:55

## 2019-05-04 RX ADMIN — Medication 1000 MILLIGRAM(S): at 08:59

## 2019-05-04 RX ADMIN — Medication 400 MILLIGRAM(S): at 06:55

## 2019-05-04 RX ADMIN — CYCLOBENZAPRINE HYDROCHLORIDE 10 MILLIGRAM(S): 10 TABLET, FILM COATED ORAL at 06:55

## 2019-05-04 NOTE — ED PROVIDER NOTE - CARE PLAN
Principal Discharge DX:	Upper back pain  Secondary Diagnosis:	Renal insufficiency  Secondary Diagnosis:	Hematuria, microscopic

## 2019-05-04 NOTE — ED PROVIDER NOTE - OBJECTIVE STATEMENT
49 y/o man, h/o DM, gout, pancreatitis, c/o upper back pain since being discharged from this hospital few days ago for admission for pancreatitis.  Abdominal pain improved, only nausea but no vomiting.  No fever/chills/weakness/numbness/urinary incontinence/dysuria/gross hematuria.  Denies any known injury.

## 2019-05-04 NOTE — ED ADULT TRIAGE NOTE - CHIEF COMPLAINT QUOTE
I have abdominal pain with bloating, nausea, headache, upper back pain today.  I was admitted x 4 days in the hospital and discharged yesterday

## 2019-05-04 NOTE — ED PROVIDER NOTE - CLINICAL SUMMARY MEDICAL DECISION MAKING FREE TEXT BOX
47 y/o man, recent admission for pancreatitis, abdominal pain resolved but c/o upper back pain since discharge, with no chest pain or shortness of breath, no fever, and no red flags for spinal cord compromise and no trauma--basic labs show renal insufficiency and UA shows microscopic hematuria (which Pt already is aware of) and he has appointment to f/u with urologist next week.  Pt given Flexeril and acetaminophen and feels better.  Advised strict return precautions and PMD f/u.

## 2019-05-04 NOTE — ED PROVIDER NOTE - CHPI ED SYMPTOMS NEG
no constipation/no numbness/no tingling/no motor function loss/no difficulty bearing weight/no bowel dysfunction/no bladder dysfunction

## 2019-10-01 ENCOUNTER — EMERGENCY (EMERGENCY)
Facility: HOSPITAL | Age: 49
LOS: 1 days | Discharge: HOME | End: 2019-10-01
Attending: EMERGENCY MEDICINE
Payer: COMMERCIAL

## 2019-10-01 VITALS
SYSTOLIC BLOOD PRESSURE: 129 MMHG | TEMPERATURE: 98 F | DIASTOLIC BLOOD PRESSURE: 80 MMHG | RESPIRATION RATE: 22 BRPM | HEART RATE: 79 BPM | OXYGEN SATURATION: 100 %

## 2019-10-01 VITALS
SYSTOLIC BLOOD PRESSURE: 150 MMHG | HEART RATE: 110 BPM | TEMPERATURE: 100 F | WEIGHT: 179.9 LBS | RESPIRATION RATE: 22 BRPM | OXYGEN SATURATION: 95 % | DIASTOLIC BLOOD PRESSURE: 100 MMHG

## 2019-10-01 LAB
ALBUMIN SERPL ELPH-MCNC: 3.9 G/DL — SIGNIFICANT CHANGE UP (ref 3.5–5)
ALP SERPL-CCNC: 66 U/L — SIGNIFICANT CHANGE UP (ref 40–120)
ALT FLD-CCNC: 24 U/L DA — SIGNIFICANT CHANGE UP (ref 10–60)
ANION GAP SERPL CALC-SCNC: 5 MMOL/L — SIGNIFICANT CHANGE UP (ref 5–17)
APPEARANCE UR: CLEAR — SIGNIFICANT CHANGE UP
APTT BLD: 37.6 SEC — HIGH (ref 27.5–36.3)
AST SERPL-CCNC: 17 U/L — SIGNIFICANT CHANGE UP (ref 10–40)
BASOPHILS # BLD AUTO: 0.04 K/UL — SIGNIFICANT CHANGE UP (ref 0–0.2)
BASOPHILS NFR BLD AUTO: 0.5 % — SIGNIFICANT CHANGE UP (ref 0–2)
BILIRUB SERPL-MCNC: 0.4 MG/DL — SIGNIFICANT CHANGE UP (ref 0.2–1.2)
BILIRUB UR-MCNC: NEGATIVE — SIGNIFICANT CHANGE UP
BUN SERPL-MCNC: 13 MG/DL — SIGNIFICANT CHANGE UP (ref 7–18)
CALCIUM SERPL-MCNC: 8.8 MG/DL — SIGNIFICANT CHANGE UP (ref 8.4–10.5)
CHLORIDE SERPL-SCNC: 105 MMOL/L — SIGNIFICANT CHANGE UP (ref 96–108)
CO2 SERPL-SCNC: 30 MMOL/L — SIGNIFICANT CHANGE UP (ref 22–31)
COLOR SPEC: YELLOW — SIGNIFICANT CHANGE UP
CREAT SERPL-MCNC: 1.05 MG/DL — SIGNIFICANT CHANGE UP (ref 0.5–1.3)
DIFF PNL FLD: ABNORMAL
EOSINOPHIL # BLD AUTO: 0.37 K/UL — SIGNIFICANT CHANGE UP (ref 0–0.5)
EOSINOPHIL NFR BLD AUTO: 4.6 % — SIGNIFICANT CHANGE UP (ref 0–6)
FLU A RESULT: SIGNIFICANT CHANGE UP
FLU A RESULT: SIGNIFICANT CHANGE UP
FLUAV AG NPH QL: SIGNIFICANT CHANGE UP
FLUBV AG NPH QL: SIGNIFICANT CHANGE UP
GLUCOSE SERPL-MCNC: 93 MG/DL — SIGNIFICANT CHANGE UP (ref 70–99)
GLUCOSE UR QL: NEGATIVE — SIGNIFICANT CHANGE UP
HCT VFR BLD CALC: 50.3 % — HIGH (ref 39–50)
HGB BLD-MCNC: 16.4 G/DL — SIGNIFICANT CHANGE UP (ref 13–17)
IMM GRANULOCYTES NFR BLD AUTO: 0.5 % — SIGNIFICANT CHANGE UP (ref 0–1.5)
INR BLD: 1.12 RATIO — SIGNIFICANT CHANGE UP (ref 0.88–1.16)
KETONES UR-MCNC: NEGATIVE — SIGNIFICANT CHANGE UP
LACTATE SERPL-SCNC: 1 MMOL/L — SIGNIFICANT CHANGE UP (ref 0.7–2)
LEUKOCYTE ESTERASE UR-ACNC: NEGATIVE — SIGNIFICANT CHANGE UP
LIDOCAIN IGE QN: 123 U/L — SIGNIFICANT CHANGE UP (ref 73–393)
LYMPHOCYTES # BLD AUTO: 1.26 K/UL — SIGNIFICANT CHANGE UP (ref 1–3.3)
LYMPHOCYTES # BLD AUTO: 15.7 % — SIGNIFICANT CHANGE UP (ref 13–44)
MCHC RBC-ENTMCNC: 28.8 PG — SIGNIFICANT CHANGE UP (ref 27–34)
MCHC RBC-ENTMCNC: 32.6 GM/DL — SIGNIFICANT CHANGE UP (ref 32–36)
MCV RBC AUTO: 88.2 FL — SIGNIFICANT CHANGE UP (ref 80–100)
MONOCYTES # BLD AUTO: 1.07 K/UL — HIGH (ref 0–0.9)
MONOCYTES NFR BLD AUTO: 13.3 % — SIGNIFICANT CHANGE UP (ref 2–14)
NEUTROPHILS # BLD AUTO: 5.25 K/UL — SIGNIFICANT CHANGE UP (ref 1.8–7.4)
NEUTROPHILS NFR BLD AUTO: 65.4 % — SIGNIFICANT CHANGE UP (ref 43–77)
NITRITE UR-MCNC: NEGATIVE — SIGNIFICANT CHANGE UP
NRBC # BLD: 0 /100 WBCS — SIGNIFICANT CHANGE UP (ref 0–0)
PH UR: 5 — SIGNIFICANT CHANGE UP (ref 5–8)
PLATELET # BLD AUTO: 229 K/UL — SIGNIFICANT CHANGE UP (ref 150–400)
POTASSIUM SERPL-MCNC: 3.7 MMOL/L — SIGNIFICANT CHANGE UP (ref 3.5–5.3)
POTASSIUM SERPL-SCNC: 3.7 MMOL/L — SIGNIFICANT CHANGE UP (ref 3.5–5.3)
PROT SERPL-MCNC: 7.6 G/DL — SIGNIFICANT CHANGE UP (ref 6–8.3)
PROT UR-MCNC: 30 MG/DL
PROTHROM AB SERPL-ACNC: 12.5 SEC — SIGNIFICANT CHANGE UP (ref 10–12.9)
RBC # BLD: 5.7 M/UL — SIGNIFICANT CHANGE UP (ref 4.2–5.8)
RBC # FLD: 13.2 % — SIGNIFICANT CHANGE UP (ref 10.3–14.5)
RSV RESULT: SIGNIFICANT CHANGE UP
RSV RNA RESP QL NAA+PROBE: SIGNIFICANT CHANGE UP
SODIUM SERPL-SCNC: 140 MMOL/L — SIGNIFICANT CHANGE UP (ref 135–145)
SP GR SPEC: 1.02 — SIGNIFICANT CHANGE UP (ref 1.01–1.02)
UROBILINOGEN FLD QL: NEGATIVE — SIGNIFICANT CHANGE UP
WBC # BLD: 8.03 K/UL — SIGNIFICANT CHANGE UP (ref 3.8–10.5)
WBC # FLD AUTO: 8.03 K/UL — SIGNIFICANT CHANGE UP (ref 3.8–10.5)

## 2019-10-01 PROCEDURE — 87040 BLOOD CULTURE FOR BACTERIA: CPT

## 2019-10-01 PROCEDURE — 96374 THER/PROPH/DIAG INJ IV PUSH: CPT

## 2019-10-01 PROCEDURE — 83605 ASSAY OF LACTIC ACID: CPT

## 2019-10-01 PROCEDURE — 71045 X-RAY EXAM CHEST 1 VIEW: CPT | Mod: 26

## 2019-10-01 PROCEDURE — 85730 THROMBOPLASTIN TIME PARTIAL: CPT

## 2019-10-01 PROCEDURE — 85610 PROTHROMBIN TIME: CPT

## 2019-10-01 PROCEDURE — 99284 EMERGENCY DEPT VISIT MOD MDM: CPT

## 2019-10-01 PROCEDURE — 87086 URINE CULTURE/COLONY COUNT: CPT

## 2019-10-01 PROCEDURE — 80053 COMPREHEN METABOLIC PANEL: CPT

## 2019-10-01 PROCEDURE — 81001 URINALYSIS AUTO W/SCOPE: CPT

## 2019-10-01 PROCEDURE — 87631 RESP VIRUS 3-5 TARGETS: CPT

## 2019-10-01 PROCEDURE — 83690 ASSAY OF LIPASE: CPT

## 2019-10-01 PROCEDURE — 99284 EMERGENCY DEPT VISIT MOD MDM: CPT | Mod: 25

## 2019-10-01 PROCEDURE — 71045 X-RAY EXAM CHEST 1 VIEW: CPT

## 2019-10-01 PROCEDURE — 85027 COMPLETE CBC AUTOMATED: CPT

## 2019-10-01 PROCEDURE — 36415 COLL VENOUS BLD VENIPUNCTURE: CPT

## 2019-10-01 RX ORDER — LORATADINE 10 MG/1
1 TABLET ORAL
Qty: 7 | Refills: 0
Start: 2019-10-01 | End: 2019-10-07

## 2019-10-01 RX ORDER — ACETAMINOPHEN 500 MG
975 TABLET ORAL ONCE
Refills: 0 | Status: COMPLETED | OUTPATIENT
Start: 2019-10-01 | End: 2019-10-01

## 2019-10-01 RX ORDER — SODIUM CHLORIDE 9 MG/ML
2500 INJECTION INTRAMUSCULAR; INTRAVENOUS; SUBCUTANEOUS ONCE
Refills: 0 | Status: COMPLETED | OUTPATIENT
Start: 2019-10-01 | End: 2019-10-01

## 2019-10-01 RX ORDER — FLUTICASONE PROPIONATE 50 MCG
1 SPRAY, SUSPENSION NASAL
Qty: 1 | Refills: 0
Start: 2019-10-01 | End: 2019-10-17

## 2019-10-01 RX ORDER — OXYMETAZOLINE HYDROCHLORIDE 0.5 MG/ML
2 SPRAY NASAL
Qty: 1 | Refills: 0
Start: 2019-10-01 | End: 2019-10-03

## 2019-10-01 RX ORDER — IBUPROFEN 200 MG
600 TABLET ORAL ONCE
Refills: 0 | Status: DISCONTINUED | OUTPATIENT
Start: 2019-10-01 | End: 2019-10-01

## 2019-10-01 RX ORDER — IBUPROFEN 200 MG
1 TABLET ORAL
Qty: 56 | Refills: 0
Start: 2019-10-01 | End: 2019-10-14

## 2019-10-01 RX ADMIN — Medication 975 MILLIGRAM(S): at 15:44

## 2019-10-01 RX ADMIN — SODIUM CHLORIDE 2500 MILLILITER(S): 9 INJECTION INTRAMUSCULAR; INTRAVENOUS; SUBCUTANEOUS at 15:16

## 2019-10-01 RX ADMIN — Medication 975 MILLIGRAM(S): at 16:42

## 2019-10-01 NOTE — ED PROVIDER NOTE - ENMT, MLM
Airway patent, Nasal mucosa clear discharge, inferior turbinate inflamed. Mouth with normal mucosa. Throat has no vesicles, no oropharyngeal exudates and uvula is midline. frontal sinus ttp

## 2019-10-01 NOTE — ED PROVIDER NOTE - CLINICAL SUMMARY MEDICAL DECISION MAKING FREE TEXT BOX
48 yr old male with hx of gout, denies DM but on record presents to ed c/o sub fever, cough, cold like sx, frontal pressure headache, chills, lighthead, generalized weakness, fatigue, nasal congestion x 4 days gradually worsening.  no sick contact, no travel, no drug use, non-smoker, no rashes, no dysuria, having normal BM, no cp, no sob, no neck pain. + myalgia.    likely viral uri r/o pna.  neck supple, no meningeal sign.  sepsis work up and dc if improve.

## 2019-10-01 NOTE — ED PROVIDER NOTE - PROGRESS NOTE DETAILS
Aguirre: improve.  pending flu result.  work up neg.  dx sinusitis.  f/u with pcp.  rx nasal spray, claratin, motrin, afrin and flonase. left ambulatory.  return precautions given.

## 2019-10-01 NOTE — ED PROVIDER NOTE - PATIENT PORTAL LINK FT
You can access the FollowMyHealth Patient Portal offered by [facility] by registering at the following website: [email or web address]. By joining [employer]’s FollowMyHealth portal, you will also be able to view your health information using other applications (apps) compatible with our system.

## 2019-10-01 NOTE — ED PROVIDER NOTE - OBJECTIVE STATEMENT
48 yr old male with hx of gout, denies DM but on record presents to ed c/o sub fever, cough, cold like sx, frontal pressure headache, chills, lighthead, generalized weakness, fatigue, nasal congestion x 4 days gradually worsening.  no sick contact, no travel, no drug use, non-smoker, no rashes, no dysuria, having normal BM, no cp, no sob, no neck pain. + myalgia.

## 2019-10-02 LAB
CULTURE RESULTS: SIGNIFICANT CHANGE UP
SPECIMEN SOURCE: SIGNIFICANT CHANGE UP

## 2020-02-13 NOTE — ED PROVIDER NOTE - GASTROINTESTINAL [-], MLM
Medication: LEVETIRACETAM 500 MG    Date of last refill: 11/6/19 (#120/2)  [place]     Last office visit: 12/18/2019  Due back to clinic per last office note:  3 months  Date next office visit scheduled:  No future appoi no diarrhea

## 2020-05-04 NOTE — ED ADULT NURSE NOTE - RESPIRATORY WDL
[facility]
[address] [place]
[place]   [address]                   INVASIVE PROCEDURE REPORT     
_______________________________________________________________________________
 
Name:       ANNI RANGEL                Room #:         206-P       ADM IN  
M.R.#:      [id number]                       Account #:      [id number]  
Admission:  05/04/20    Attend Phys:    Brad Paiz MD,
Discharge:              Date of Birth:  [birth year]  
                                                          Report #: [id number]
                                                                    [id number]
_______________________________________________________________________________
THIS REPORT FOR:  
 
cc:  Michael Kimbrough MD, Curtis MD Mancuso, Gerald M. MD [facility]                                        
                                                                       ~
 
--------------- APPROVED REPORT --------------
 
 
Study performed:  05/04/2020 07:49:27
 
Patient Details
Patient Status: Out-Patient                  Room #: 
The patient is a 66 year-old male
 
Event Personnel
Brad Paiz  Interventional Cardiologist, Genny Villa RN RN, 
Alejandra Trammell RTR, Bill Doherty Roberta  Monitor
 
Procedures Performed
Art Access - R femoral artery*  Wade Access - R femoral vein  Right 
and Left Heart Cath w/or w/o Coronarie [id number] [facility] Aortogram 
Abdominal Peripheral Angio 049531 Hemostasis w/ Mynx  39655 Initial 
Mod Sed Same Phys/QHP Gr5y [id number] 08915 Mod Sed Same Phys/QHP Ea 
[id number]
 
Indication
Chest pain
 
Procedure Narrative
The Right Groin^ was infiltrated with subcutaneous anesthesia. A 
Right Heart Catheterization was performed with a 7 Fr. Valera-Stefan 
catheter and pressure were recorded. Cardiac outputs were obtained by 
the Thermal Dilution method. A PINNACLE 6FR Sheath #[id number] sheath was 
inserted into the RFA 6F^. Coronary angiography was performed using 
coronary diagnostic catheters. The right coronary system was accessed 
and visualized with a JR4 catheter. The left coronary system was 
accessed and visualized with a JL4 catheter. The left ventricle was 
accessed and visualized with a STR PIG catheter. Left ventriculogram 
was performed in 30 degree projection. Hemostasis was obtained with 
manual pressure following sheath removal without any complications. 
The patient tolerated the procedure well and there were no 
complications associated with the procedure. There was no hematoma. 
VENOUS HEMOSTASIS MANUAL PRESSURE
 
 
 
[facility]
[place]
[place]  [address]
Phone:  [phone]                    INVASIVE PROCEDURE REPORT     
_______________________________________________________________________________
 
Name:            ANNI RANGEL                Room #:        206-P       [facility] [place]
..#:           [id number]          Account #:     [id number]  
Admission:       05/04/20         Attend Phys:   Brad Paiz,
Discharge:                  Date of Birth: [birth year]  
                         Report #:      [id number]
        [id number]
_______________________________________________________________________________
Intraoperative Conscious Sedation
Sedation start time:  0836           Case end Time:  
0925    
Fentanyl  50 mcg    Versed  2 mg  
 
Fluoro Time:    3.48 minutes    
Dose:     DAP 4896.50 cGycm2  486 mGy  
Contrast Type and Amount:  Omnipaque 105 ml   
 
Hemodynamics
The right atrial mean pressure is 14 mmHg. The right ventricular 
pressure is 42/10 mmHg. The pulmonary artery pressure is 49/2 mmHg 
with a mean of 27 mmHg. The mean pulmonary capillary wedge pressure 
is 22 mmHg. The aortic pressure is 158/76 mmHg with a mean of 109 
mmHg. The left ventricular pressure is 175/4 mmHg with a mean of 
mmHg. The left ventricular end diastolic pressure is 29 mmHg. The 
cardiac output using thermo method is 5.35 L/min. The cardiac index 
using thermo method is 2.59 L/min/m2.
 
Conclusion
1.  Left main calcified widely patent giving rise to LAD and 
circumflex.
#2 the LAD is complex anatomy with an eccentric proximal 8090% lesion 
heavily calcified mid vessel lesion which is 95% at a diagonal 
takeoff.  The LAD and diagonal are then fairly well preserved distal 
to this.
#3 eccentric proximal lesion and heavily calcified circumflex artery 
with a 90% proximal and then a mid OM branch of 90% with 1 large 
preserved OM distal to this.  In between these lesions are previously 
placed stent which has mild restenosis.
#4 normal left ventricular size and systolic function EF 55 to 60%
#5 abdominal aortogram with mild disease no aneurysm.  Wide patency 
noted in the iliac system.
 
Recommendations and plan: Continue aggressive risk factor 
modification patient transferred to CCU has anginal symptoms which 
persists.  Will proceed with cardiovascular surgical consultation for 
revascularization by bypass.  Significant proximal calcification 
high-grade lesions make this difficult for PCI stent procedure.
 
 
 
 
 
  <ELECTRONICALLY SIGNED>
   By: Brad Paiz MD, FACC   
  05/04/20 1745
D: 05/04/20 1745                           _____________________________________
T: 05/04/20 1745                           Brad Paiz MD, FACC     /INF
Breathing spontaneous and unlabored. Breath sounds clear and equal bilaterally with regular rhythm.

## 2020-09-12 NOTE — ED ADULT NURSE NOTE - NS ED NURSE RECORD ANOTHER HT AND WT
Pt. Released in stable condition, ambulated per self to private car. Instructed pt to follow-up with family doctor as needed for recheck or go directly to the emergency department for any concerns/worsening conditions. Pt. Verbalized understanding of instructions. No questions at this time.        Hitesh Reyes RN  09/12/20 3615
Yes

## 2020-10-22 NOTE — ED ADULT NURSE NOTE - GASTROINTESTINAL ASSESSMENT
Previous Labs: No
How Did The Hair Loss Occur?: gradual in onset
How Severe Is Your Hair Loss?: mild
WDL

## 2020-12-02 NOTE — ED ADULT NURSE NOTE - LATERALITY
----- Message from LATTO, [address] sent at 1/18/2018 10:29 AM CST -----  Regarding: 3 yr CLN recall  Recall colon in 3 years per.  Colon done 1/02/2018 left 1st

## 2021-05-21 NOTE — PATIENT PROFILE ADULT - NSPROPTRIGHTCAREGIVER_GEN_A_NUR
declines [Fatigue] : fatigue [Fever] : no fever [Chills] : no chills [Hot Flashes] : no hot flashes [Discharge] : no discharge [Pain] : no pain [Redness] : no redness [Dryness] : no dryness  [Earache] : no earache [Hearing Loss] : no hearing loss [Nosebleed] : no nosebleeds [Hoarseness] : no hoarseness [Nasal Discharge] : no nasal discharge [Sore Throat] : no sore throat [Postnasal Drip] : no postnasal drip [Chest Pain] : no chest pain [Palpitations] : no palpitations [Leg Claudication] : no leg claudication [Lower Ext Edema] : no lower extremity edema [Orthopnea] : no orthopnea [Paroxysmal Nocturnal Dyspnea] : no paroxysmal nocturnal dyspnea [Shortness Of Breath] : no shortness of breath [Wheezing] : no wheezing [Cough] : no cough [Dyspnea on Exertion] : no dyspnea on exertion [Abdominal Pain] : no abdominal pain [Nausea] : no nausea [Constipation] : no constipation [Diarrhea] : diarrhea [Vomiting] : no vomiting [Heartburn] : no heartburn [Melena] : no melena [Dysuria] : no dysuria [Incontinence] : no incontinence [Nocturia] : no nocturia [Poor Libido] : libido not poor [Hematuria] : no hematuria [Frequency] : no frequency [Vaginal Discharge] : no vaginal discharge [Joint Pain] : no joint pain [Joint Stiffness] : no joint stiffness [Joint Swelling] : no joint swelling [Muscle Weakness] : no muscle weakness [Muscle Pain] : no muscle pain [Back Pain] : no back pain [Itching] : no itching [Mole Changes] : no mole changes [Nail Changes] : no nail changes [Hair Changes] : no hair changes [Skin Rash] : no skin rash [Headache] : no headache [Dizziness] : no dizziness [Fainting] : no fainting [Confusion] : no confusion [Memory Loss] : no memory loss [Unsteady Walking] : no ataxia [Suicidal] : not suicidal [Insomnia] : no insomnia [Anxiety] : no anxiety [Depression] : no depression

## 2023-03-20 ENCOUNTER — EMERGENCY (EMERGENCY)
Facility: HOSPITAL | Age: 53
LOS: 1 days | Discharge: HOME | End: 2023-03-20
Attending: EMERGENCY MEDICINE
Payer: COMMERCIAL

## 2023-03-20 VITALS
SYSTOLIC BLOOD PRESSURE: 137 MMHG | RESPIRATION RATE: 18 BRPM | OXYGEN SATURATION: 96 % | HEART RATE: 84 BPM | DIASTOLIC BLOOD PRESSURE: 93 MMHG | TEMPERATURE: 99 F

## 2023-03-20 VITALS
WEIGHT: 220.02 LBS | OXYGEN SATURATION: 98 % | DIASTOLIC BLOOD PRESSURE: 76 MMHG | HEIGHT: 69 IN | SYSTOLIC BLOOD PRESSURE: 128 MMHG | RESPIRATION RATE: 19 BRPM | HEART RATE: 102 BPM | TEMPERATURE: 98 F

## 2023-03-20 PROCEDURE — 82962 GLUCOSE BLOOD TEST: CPT

## 2023-03-20 PROCEDURE — 30903 CONTROL OF NOSEBLEED: CPT

## 2023-03-20 PROCEDURE — 99283 EMERGENCY DEPT VISIT LOW MDM: CPT

## 2023-03-20 PROCEDURE — 99284 EMERGENCY DEPT VISIT MOD MDM: CPT | Mod: 25

## 2023-03-20 RX ORDER — ACETAMINOPHEN 500 MG
650 TABLET ORAL ONCE
Refills: 0 | Status: COMPLETED | OUTPATIENT
Start: 2023-03-20 | End: 2023-03-20

## 2023-03-20 RX ADMIN — Medication 650 MILLIGRAM(S): at 02:57

## 2023-03-20 NOTE — ED PROVIDER NOTE - CLINICAL SUMMARY MEDICAL DECISION MAKING FREE TEXT BOX
Pt with epistaxis 2/2 dry nostrils, but since pt blows his nose often, this will cause recurrent epistaxis.  Source of bleeding identified, cauterized with Silver nitrate, will reassess

## 2023-03-20 NOTE — ED PROVIDER NOTE - ENMT, MLM
Airway patent, Nasal mucosa clear. Mouth with normal mucosa. Throat has no vesicles, no oropharyngeal exudates and uvula is midline.  Rt nostril-dry, source bleeding noted medial aspect, no active bleeding

## 2023-03-20 NOTE — ED PROVIDER NOTE - PATIENT PORTAL LINK FT
You can access the FollowMyHealth Patient Portal offered by [facility] by registering at the following website: [email or web address]. By joining [email or web address]’s FollowMyHealth portal, you will also be able to view your health information using other applications (apps) compatible with our system.

## 2023-03-20 NOTE — ED PROVIDER NOTE - OBJECTIVE STATEMENT
52 y.o. male denies having DM, pt claims with epistaxis 7 times in past 9 days.  Each episode lasts 7 mins. after blowing his noses 2/2 dryness.  Last episode tonight, no dizziness, HA, N/V

## 2023-03-20 NOTE — ED PROVIDER NOTE - NSFOLLOWUPINSTRUCTIONS_ED_ALL_ED_FT
Nosebleed, Adult      A nosebleed is when blood comes out of the nose. Nosebleeds are common. Usually, they are not a sign of a serious condition.    Nosebleeds can happen if a blood vessel in your nose starts to bleed or if the lining of your nose (mucous membrane) cracks. They are commonly caused by:  •Allergies.      •Colds.      •Picking your nose.      •Blowing your nose too hard.      •An injury from sticking an object into your nose or getting hit in the nose.      •Dry or cold air.      Less common causes of nosebleeds include:  •Toxic fumes.      •Something abnormal in the nose or in the air-filled spaces in the bones of the face (sinuses).      •Growths in the nose, such as polyps.      •Blood thinners or conditions that cause blood to clot slowly.      •Certain illnesses or procedures that irritate or dry out the nasal passages.        Follow these instructions at home:      When you have a nosebleed:      •Sit down and tilt your head slightly forward.      •Use a clean towel or tissue to pinch your nostrils under the bony part of your nose. After 5 minutes, let go of your nose and see if bleeding starts again. Do not release pressure before that time. If there is still bleeding, repeat the pinching and holding for 5 minutes or until the bleeding stops.      • Do not place tissues or gauze in the nose to stop the bleeding.      •Avoid lying down and avoid tilting your head backward. That may make blood collect in the throat and cause gagging or coughing.      •Use a nasal spray decongestant to help with a nosebleed as told by your health care provider.      After a nosebleed:     •Avoid blowing your nose or sniffing for a number of hours.      •Avoid straining, lifting, or bending at the waist for several days. You may go back to other normal activities as you are able.    •If you are taking aspirin or blood thinners and you have nosebleeds, talk to your health care provider. These medicines make bleeding more likely.  •Ask your health care provider if you should stop taking the medicines or if you should adjust the dose.      •Do not stop taking medicines that your health care provider has recommended unless he or she tells you to stop taking them.      •If your nosebleed was caused by dry mucous membranes, use over-the-counter saline nasal spray or gel and a humidifier as told by your health care provider. This will keep the mucous membranes moist and allow them to heal. If you need to use one of these products:  •Choose one that is water-soluble.      •Use only as much as you need and use it only as often as needed.      •Do not lie down right after you use it.      •If you get nosebleeds often, talk with your health care provider about medical treatments. Options may include:  •Nasal cautery. This treatment stops and prevents nosebleeds by using a chemical swab or electrical device to lightly burn tiny blood vessels inside the nose.      •Nasal packing. A gauze or other material is placed in the nose to keep constant pressure on the bleeding area.          Contact a health care provider if you:    •Have a fever.      •Get nosebleeds often or more often than usual.      •Bruise very easily.      •Have a nosebleed from having something stuck in your nose.      •Have bleeding in your mouth.      •Vomit or cough up brown material.      •Have a nosebleed after you start a new medicine.        Get help right away if:    •You have a nosebleed after a fall or a head injury.      •Your nosebleed does not go away after 20 minutes.      •You feel dizzy or weak.      •You have unusual bleeding from other parts of your body.      •You have unusual bruising on other parts of your body.      •You become sweaty.      •You vomit blood.        Summary    •A nosebleed is when blood comes out of the nose. Common causes include allergies, an injury to the nose, or cold or dry air.      •Initial treatment includes applying pressure for 5 minutes.       •Moisturizing the nose with saline nasal spray or gel after a nosebleed may help prevent future bleeding.      •Get help right away if your nosebleed does not go away after 20 minutes.      This information is not intended to replace advice given to you by your health care provider. Make sure you discuss any questions you have with your health care provider.      Apply Bacitracin ointment to your nostrils to keep them moist  Do not blow, pick your nose.  Do not sneeze

## 2024-07-03 NOTE — ED ADULT NURSE NOTE - PAIN: BODY LOCATION
S-(situation): dizzy upon waking this morning at 6am, as soon as he opened his eyes while still laying in bed. The ceiling was spinning  Has continued this morning but somewhat better.  A bit unstable when walking. No pain anywhere.  LS clear  Skin warm and dry  Apical pulse 52 and regular  Alert and Speech clear   More sensation of dizziness when RN asked him to deep breath for lung sounds  This has never happened to him before    B-(background): no recent illness,. Has atrial fib but feels he is in rhythm  now , he is aware when in A Fib and good since last cardioversion  No recent change in meds (not on anticoag at this time)  No travel  No recent surgeries or procedures  Normal oral intake  BP Readings from Last 3 Encounters:   07/03/24 121/70   05/01/24 126/73   03/27/24 102/72         A-(assessment): discussed with Dr Tan and pt is safe to wait for UC to open in 1/2 hour    R-(recommendations): escorted to lobby and pt will wait rooming with UC staff.    Candace Figueroa RN, BSN  [facility]           
abdominal pain /back pain

## 2024-08-07 NOTE — H&P ADULT - REASON FOR ADMISSION
flank pain, hematuria, fever [Home] : at home, [unfilled] , at the time of the visit. [Medical Office: ([facility])___] : at the medical office located in  [Patient] : the patient [Follow-Up] : a follow-up visit [Other: _____] : [unfilled]

## 2024-09-17 NOTE — ED PROVIDER NOTE - CARDIAC, MLM
Normal rate, regular rhythm.  Heart sounds S1, S2.  No murmurs, rubs or gallops. "I will harm others and myself."

## 2024-12-26 NOTE — ED ADULT NURSE REASSESSMENT NOTE - NS ED NURSE REASSESS COMMENT FT1
Patient is alert and oriented x 3, no active epistaxis noted. Re-evaluated by Dr. Aguirre with roseanna teaching and instructions rendered. Patient is  discharge in stable condition.
No

## 2025-04-03 NOTE — ED ADULT TRIAGE NOTE - BP NONINVASIVE DIASTOLIC (MM HG)
Called  center to inform them that an order for PRA is going to be faxed - [facility] requesting vaccination records. Still need to look for hep B DNA lab  
83

## 2025-04-29 NOTE — ED ADULT TRIAGE NOTE - BSA (M2)
Arterial Line    Date/Time: 4/29/2025 9:18 AM    Performed by: Benjamin Rinaldi DO  Authorized by: Christofer Doyle MD  Consent: Verbal consent obtained.  Risks and benefits: risks, benefits and alternatives were discussed  Consent given by: patient  Patient understanding: patient states understanding of the procedure being performed  Required items: required blood products, implants, devices, and special equipment available  Patient identity confirmed: hospital-assigned identification number and arm band  Indications: hemodynamic monitoring  Location: right radial    Anesthesia:  Local Anesthetic: lidocaine 1% without epinephrine  Anesthetic total: 2 mL    Sedation:  Patient sedated: no    Needle gauge: 20  Seldinger technique: Seldinger technique used  Number of attempts: 1  Post-procedure: dressing applied  Post-procedure CMS: normal  Patient tolerance: patient tolerated the procedure well with no immediate complications       Benjamin Rinaldi D.O.  Emergency Medicine Resident, PGY-1  [employer]:     2.15